# Patient Record
Sex: FEMALE | Race: BLACK OR AFRICAN AMERICAN | NOT HISPANIC OR LATINO | Employment: UNEMPLOYED | URBAN - METROPOLITAN AREA
[De-identification: names, ages, dates, MRNs, and addresses within clinical notes are randomized per-mention and may not be internally consistent; named-entity substitution may affect disease eponyms.]

---

## 2023-05-03 ENCOUNTER — OFFICE VISIT (OUTPATIENT)
Dept: FAMILY MEDICINE CLINIC | Facility: CLINIC | Age: 38
End: 2023-05-03

## 2023-05-03 VITALS
BODY MASS INDEX: 36.16 KG/M2 | HEART RATE: 68 BPM | TEMPERATURE: 97.7 F | SYSTOLIC BLOOD PRESSURE: 124 MMHG | OXYGEN SATURATION: 99 % | HEIGHT: 66 IN | WEIGHT: 225 LBS | DIASTOLIC BLOOD PRESSURE: 82 MMHG

## 2023-05-03 DIAGNOSIS — Z00.00 ANNUAL PHYSICAL EXAM: Primary | ICD-10-CM

## 2023-05-03 DIAGNOSIS — Z86.2 HISTORY OF ANEMIA: ICD-10-CM

## 2023-05-03 DIAGNOSIS — Z13.6 SCREENING FOR CARDIOVASCULAR CONDITION: ICD-10-CM

## 2023-05-03 DIAGNOSIS — Z12.4 CERVICAL CANCER SCREENING: ICD-10-CM

## 2023-05-03 DIAGNOSIS — Z13.1 SCREENING FOR DIABETES MELLITUS: ICD-10-CM

## 2023-05-03 DIAGNOSIS — R06.83 SNORING: ICD-10-CM

## 2023-05-03 DIAGNOSIS — L70.9 ACNE, UNSPECIFIED ACNE TYPE: ICD-10-CM

## 2023-05-03 NOTE — PATIENT INSTRUCTIONS
Wellness Visit for Adults   AMBULATORY CARE:   A wellness visit  is when you see your healthcare provider to get screened for health problems  Your healthcare provider will also give you advice on how to stay healthy  Write down your questions so you remember to ask them  Ask your healthcare provider how often you should have a wellness visit  What happens at a wellness visit:  Your healthcare provider will ask about your health, and your family history of health problems  This includes high blood pressure, heart disease, and cancer  He or she will ask if you have symptoms that concern you, if you smoke, and about your mood  You may also be asked about your intake of medicines, supplements, food, and alcohol  Any of the following may be done: Your weight  will be checked  Your height may also be checked so your body mass index (BMI) can be calculated  Your BMI shows if you are at a healthy weight  Your blood pressure  and heart rate will be checked  Your temperature may also be checked  Blood and urine tests  may be done  Blood tests may be done to check your cholesterol levels  Abnormal cholesterol levels increase your risk for heart disease and stroke  You may also need a blood or urine test to check for diabetes if you are at increased risk  Urine tests may be done to look for signs of an infection or kidney disease  A physical exam  includes checking your heartbeat and lungs with a stethoscope  Your healthcare provider may also check your skin to look for sun damage  Screening tests  may be recommended  A screening test is done to check for diseases that may not cause symptoms  The screening tests you may need depend on your age, gender, family history, and lifestyle habits  For example, colorectal screening may be recommended if you are 48years old or older  Screening tests you need if you are a woman:   A Pap smear  is used to screen for cervical cancer   Pap smears are usually done every 3 to 5 years depending on your age  You may need them more often if you have had abnormal Pap smear test results in the past  Ask your healthcare provider how often you should have a Pap smear  A mammogram  is an x-ray of your breasts to screen for breast cancer  Experts recommend mammograms every 2 years starting at age 48 years  You may need a mammogram at age 52 years or younger if you have an increased risk for breast cancer  Talk to your healthcare provider about when you should start having mammograms and how often you need them  Vaccines you may need:   Get an influenza vaccine  every year  The influenza vaccine protects you from the flu  Several types of viruses cause the flu  The viruses change over time, so new vaccines are made each year  Get a tetanus-diphtheria (Td) booster vaccine  every 10 years  This vaccine protects you against tetanus and diphtheria  Tetanus is a severe infection that may cause painful muscle spasms and lockjaw  Diphtheria is a severe bacterial infection that causes a thick covering in the back of your mouth and throat  Get a human papillomavirus (HPV) vaccine  if you are female and aged 23 to 32 or male 23 to 24 and never received it  This vaccine protects you from HPV infection  HPV is the most common infection spread by sexual contact  HPV may also cause vaginal, penile, and anal cancers  Get a pneumococcal vaccine  if you are aged 72 years or older  The pneumococcal vaccine is an injection given to protect you from pneumococcal disease  Pneumococcal disease is an infection caused by pneumococcal bacteria  The infection may cause pneumonia, meningitis, or an ear infection  Get a shingles vaccine  if you are 60 or older, even if you have had shingles before  The shingles vaccine is an injection to protect you from the varicella-zoster virus  This is the same virus that causes chickenpox   Shingles is a painful rash that develops in people who had chickenpox or have been exposed to the virus  How to eat healthy:  My Plate is a model for planning healthy meals  It shows the types and amounts of foods that should go on your plate  Fruits and vegetables make up about half of your plate, and grains and protein make up the other half  A serving of dairy is included on the side of your plate  The amount of calories and serving sizes you need depends on your age, gender, weight, and height  Examples of healthy foods are listed below:  Eat a variety of vegetables  such as dark green, red, and orange vegetables  You can also include canned vegetables low in sodium (salt) and frozen vegetables without added butter or sauces  Eat a variety of fresh fruits , canned fruit in 100% juice, frozen fruit, and dried fruit  Include whole grains  At least half of the grains you eat should be whole grains  Examples include whole-wheat bread, wheat pasta, brown rice, and whole-grain cereals such as oatmeal     Eat a variety of protein foods such as seafood (fish and shellfish), lean meat, and poultry without skin (turkey and chicken)  Examples of lean meats include pork leg, shoulder, or tenderloin, and beef round, sirloin, tenderloin, and extra lean ground beef  Other protein foods include eggs and egg substitutes, beans, peas, soy products, nuts, and seeds  Choose low-fat dairy products such as skim or 1% milk or low-fat yogurt, cheese, and cottage cheese  Limit unhealthy fats  such as butter, hard margarine, and shortening  Exercise:  Exercise at least 30 minutes per day on most days of the week  Some examples of exercise include walking, biking, dancing, and swimming  You can also fit in more physical activity by taking the stairs instead of the elevator or parking farther away from stores  Include muscle strengthening activities 2 days each week  Regular exercise provides many health benefits   It helps you manage your weight, and decreases your risk for type 2 diabetes, heart disease, stroke, and high blood pressure  Exercise can also help improve your mood  Ask your healthcare provider about the best exercise plan for you  General health and safety guidelines:   Do not smoke  Nicotine and other chemicals in cigarettes and cigars can cause lung damage  Ask your healthcare provider for information if you currently smoke and need help to quit  E-cigarettes or smokeless tobacco still contain nicotine  Talk to your healthcare provider before you use these products  Limit alcohol  A drink of alcohol is 12 ounces of beer, 5 ounces of wine, or 1½ ounces of liquor  Lose weight, if needed  Being overweight increases your risk of certain health conditions  These include heart disease, high blood pressure, type 2 diabetes, and certain types of cancer  Protect your skin  Do not sunbathe or use tanning beds  Use sunscreen with a SPF 15 or higher  Apply sunscreen at least 15 minutes before you go outside  Reapply sunscreen every 2 hours  Wear protective clothing, hats, and sunglasses when you are outside  Drive safely  Always wear your seatbelt  Make sure everyone in your car wears a seatbelt  A seatbelt can save your life if you are in an accident  Do not use your cell phone when you are driving  This could distract you and cause an accident  Pull over if you need to make a call or send a text message  Practice safe sex  Use latex condoms if are sexually active and have more than one partner  Your healthcare provider may recommend screening tests for sexually transmitted infections (STIs)  Wear helmets, lifejackets, and protective gear  Always wear a helmet when you ride a bike or motorcycle, go skiing, or play sports that could cause a head injury  Wear protective equipment when you play sports  Wear a lifejacket when you are on a boat or doing water sports      © Copyright [employer] 2022 Information is for End User's use only and may not be sold, redistributed or otherwise used for commercial purposes  The above information is an  only  It is not intended as medical advice for individual conditions or treatments  Talk to your doctor, nurse or pharmacist before following any medical regimen to see if it is safe and effective for you

## 2023-05-03 NOTE — PROGRESS NOTES
[address]    NAME: Rafael Ferrari  AGE: 40 y o  SEX: female  : 1985     DATE: 5/3/2023     Assessment and Plan:     Problem List Items Addressed This Visit    None  Visit Diagnoses     Annual physical exam    -  Primary    Screening for diabetes mellitus        Relevant Orders    Hemoglobin A1C    Screening for cardiovascular condition        Relevant Orders    Lipid panel    BMI 36 0-36 9,adult        Relevant Orders    CBC and Platelet    Comprehensive metabolic panel    TSH, 3rd generation with Free T4 reflex    History of anemia        Relevant Orders    Iron Panel (Includes Ferritin, Iron Sat%, Iron, and TIBC)    Acne, unspecified acne type        Relevant Orders    Ambulatory Referral to Dermatology    Snoring        Relevant Orders    Ambulatory Referral to Sleep Medicine    Cervical cancer screening        Relevant Orders    Ambulatory Referral to Gynecology          Immunizations and preventive care screenings were discussed with patient today  Appropriate education was printed on patient's after visit summary  Counseling:  Exercise: the importance of regular exercise/physical activity was discussed  Recommend exercise 3-5 times per week for at least 30 minutes  BMI Counseling: Body mass index is 36 32 kg/m²  The BMI is above normal  Nutrition recommendations include decreasing portion sizes, encouraging healthy choices of fruits and vegetables, decreasing fast food intake, consuming healthier snacks, limiting drinks that contain sugar and moderation in carbohydrate intake  Exercise recommendations include moderate physical activity 150 minutes/week, exercising 3-5 times per week and strength training exercises  No pharmacotherapy was ordered  Rationale for BMI follow-up plan is due to patient being overweight or obese       Depression Screening and Follow-up Plan: Patient's depression screening was positive with a PHQ-2 score of 3  Their PHQ-9 score was 13  Return in 1 year (on 5/3/2024)  Chief Complaint:     Chief Complaint   Patient presents with    New Patient Visit    Physical Exam     PE due  History of Present Illness:     Adult Annual Physical   Patient here for a comprehensive physical exam  The patient reports problems - see below  Hx of anemia even before pregnant, unclear etiology (denies sickle cell or other genetic issues) possible heavy periods     Diet and Physical Activity  Diet/Nutrition: well balanced diet  Exercise: walking  Depression Screening  PHQ-2/9 Depression Screening    Little interest or pleasure in doing things: 1 - several days  Feeling down, depressed, or hopeless: 2 - more than half the days  Trouble falling or staying asleep, or sleeping too much: 1 - several days  Feeling tired or having little energy: 3 - nearly every day  Poor appetite or overeatin - several days  Feeling bad about yourself - or that you are a failure or have let yourself or your family down: 3 - nearly every day  Trouble concentrating on things, such as reading the newspaper or watching television: 2 - more than half the days  Moving or speaking so slowly that other people could have noticed  Or the opposite - being so fidgety or restless that you have been moving around a lot more than usual: 0 - not at all  Thoughts that you would be better off dead, or of hurting yourself in some way: 0 - not at all  PHQ-2 Score: 3  PHQ-2 Interpretation: POSITIVE depression screen  PHQ-9 Score: 13   PHQ-9 Interpretation: Moderate depression        General Health  Sleep: sleeps well  Hearing: normal - bilateral   Vision: no vision problems and goes for regular eye exams  Dental: regular dental visits  /GYN Health  Last menstrual period: current  Contraceptive method: barrier methods    History of STDs?: no      Review of Systems:     Review of Systems Constitutional: Negative for chills, fatigue and fever  HENT: Negative for rhinorrhea and sore throat  Eyes: Negative for visual disturbance  Respiratory: Negative for cough and shortness of breath  Cardiovascular: Negative for chest pain and palpitations  Gastrointestinal: Negative for abdominal pain, constipation, diarrhea, nausea and vomiting  Genitourinary: Negative for difficulty urinating, dysuria and frequency  Musculoskeletal: Negative for arthralgias and myalgias  Skin: Negative for color change and rash  Neurological: Negative for weakness and headaches  Past Medical History:     History reviewed  No pertinent past medical history  Past Surgical History:     Past Surgical History:   Procedure Laterality Date     SECTION        Social History:     Social History     Socioeconomic History    Marital status: /Civil Union     Spouse name: None    Number of children: None    Years of education: None    Highest education level: None   Occupational History    None   Tobacco Use    Smoking status: Every Day     Types: Cigarettes     Start date:     Smokeless tobacco: None   Substance and Sexual Activity    Alcohol use: Yes    Drug use: Never    Sexual activity: Yes   Other Topics Concern    None   Social History Narrative    None     Social Determinants of Health     Financial Resource Strain: Not on file   Food Insecurity: Not on file   Transportation Needs: Not on file   Physical Activity: Not on file   Stress: Not on file   Social Connections: Not on file   Intimate Partner Violence: Not on file   Housing Stability: Not on file      Family History:     History reviewed  No pertinent family history  Current Medications:     No current outpatient medications on file  No current facility-administered medications for this visit        Allergies:     No Known Allergies   Physical Exam:     /82 (BP Location: Left arm, Patient Position: Sitting, "Cuff Size: Standard)   Pulse 68   Temp 97 7 °F (36 5 °C)   Ht 5' 6\" (1 676 m)   Wt 102 kg (225 lb)   SpO2 99%   BMI 36 32 kg/m²     Physical Exam  Constitutional:       General: She is not in acute distress  Appearance: Normal appearance  She is obese  She is not ill-appearing  HENT:      Head: Normocephalic and atraumatic  Right Ear: Tympanic membrane, ear canal and external ear normal       Left Ear: Tympanic membrane, ear canal and external ear normal       Nose: Nose normal       Mouth/Throat:      Mouth: Mucous membranes are moist       Pharynx: Oropharynx is clear  No oropharyngeal exudate or posterior oropharyngeal erythema  Eyes:      General: No scleral icterus  Right eye: No discharge  Left eye: No discharge  Extraocular Movements: Extraocular movements intact  Conjunctiva/sclera: Conjunctivae normal       Pupils: Pupils are equal, round, and reactive to light  Cardiovascular:      Rate and Rhythm: Normal rate and regular rhythm  Pulses: Normal pulses  Heart sounds: Normal heart sounds  No murmur heard  Pulmonary:      Effort: Pulmonary effort is normal  No respiratory distress  Breath sounds: Normal breath sounds  Abdominal:      General: Bowel sounds are normal       Palpations: Abdomen is soft  Tenderness: There is no abdominal tenderness  Musculoskeletal:         General: Normal range of motion  Cervical back: Normal range of motion and neck supple  Lymphadenopathy:      Cervical: No cervical adenopathy  Skin:     General: Skin is warm and dry  Capillary Refill: Capillary refill takes less than 2 seconds  Neurological:      General: No focal deficit present  Mental Status: She is alert and oriented to person, place, and time  Mental status is at baseline  Cranial Nerves: No cranial nerve deficit     Psychiatric:         Mood and Affect: Mood normal           Keith Jackson MD   [address] [place] [address]" PRACTICE [id number]  Depression Screening Follow-up Plan: Patient's depression screening was positive with a PHQ-2 score of 3  Their PHQ-9 score was 13  Patient assessed for underlying major depression  They have no active suicidal ideations  Brief counseling provided and recommend additional follow-up/re-evaluation next office visit

## 2023-05-17 ENCOUNTER — OFFICE VISIT (OUTPATIENT)
Age: 38
End: 2023-05-17

## 2023-05-17 VITALS
DIASTOLIC BLOOD PRESSURE: 81 MMHG | OXYGEN SATURATION: 98 % | BODY MASS INDEX: 36.64 KG/M2 | RESPIRATION RATE: 18 BRPM | HEIGHT: 66 IN | SYSTOLIC BLOOD PRESSURE: 122 MMHG | TEMPERATURE: 98.5 F | HEART RATE: 73 BPM | WEIGHT: 228 LBS

## 2023-05-17 DIAGNOSIS — R06.83 SNORING: ICD-10-CM

## 2023-05-17 DIAGNOSIS — G47.33 OSA (OBSTRUCTIVE SLEEP APNEA): Primary | ICD-10-CM

## 2023-05-17 DIAGNOSIS — E66.9 OBESITY (BMI 30-39.9): ICD-10-CM

## 2023-05-17 NOTE — PROGRESS NOTES
"Assessment/Plan:     Diagnoses and all orders for this visit:    EMMANUEL (obstructive sleep apnea)  -     Diagnostic Sleep Study; Future    Snoring  -     Ambulatory Referral to Sleep Medicine    Obesity (BMI 30-39  9)          Plan for follow up:  obstructive sleep apnea Etiology pathogenesis discussed with the patient in detail  Patient has signs and symptoms of obstructive sleep apnea  He will undergo an all night polysomnogram and if there is evidence of abnormal nocturnal breathing he will undergo a CPAP titration study for maximal benefit  Consequences of untreated sleep apnea including excessive daytime sleepiness and increased risk for myocardial infarction stroke atrial fibrillation discussed with the patient  Testing procedure was discussed in detail  Cautioned against driving when sleepy  Recommend weight loss  Follow-up after the above testing  No follow-ups on file  All questions are answered to the patient's satisfaction and understanding  She verbalizes understanding  She is encouraged to call with any further questions or concerns  Portions of the record may have been created with voice recognition software  Occasional wrong word or \"sound a like\" substitutions may have occurred due to the inherent limitations of voice recognition software  Read the chart carefully and recognize, using context, where substitutions have occurred  a    Electronically Signed by Shelley Campos MD    ______________________________________________________________________    Chief Complaint:   Chief Complaint   Patient presents with   • Sleeping Problem        Patient ID: [id number] is a 40 y o  y o  female has no past medical history on file  5/17/2023  Patient presents today for initial visit  Patient is here for evaluation for obstructive sleep apnea    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, from 6 PM to 6 AM and she does have about 15 minutes commute to work, her sleep schedule when she works nights, " she goes to bed at around 7:30 AM falls asleep quickly and is out of bed at 3 PM usually does not have any awakenings she states, very occasionally her 25month-old son wakes her up, the nights that she is not working she states she is still keeps herself up until 4 AM and then falls asleep until 11 AM or noon time, sometimes has to wake up early with the kids she states  No history of any early morning headaches no symptoms related to restless legs  Occupational/Exposure history:[employer] , 6 pm -6 am   Travel history: None  Review of Systems   Constitutional: Positive for unexpected weight change (20-25 lbs in the past year )  HENT: Negative  Eyes: Negative  Respiratory: Negative  Loud snoring, does not know of any witnessed apneic spells, does have choking and gasping for air she states  Cardiovascular: Negative  Gastrointestinal: Negative  Endocrine: Negative  Genitourinary: Negative  Musculoskeletal: Negative  Allergic/Immunologic: Negative  Neurological: Negative  Hematological: Negative  Psychiatric/Behavioral: Negative  Social history: She reports that she has been smoking cigarettes  She started smoking about 23 years ago  She has never used smokeless tobacco  She reports current alcohol use  She reports that she does not use drugs  Past surgical history:   Past Surgical History:   Procedure Laterality Date   •  SECTION       Family history: History reviewed  No pertinent family history  There is no immunization history on file for this patient  No current outpatient medications on file  No current facility-administered medications for this visit  Allergies: Patient has no known allergies      Objective:  Vitals:    23 1412 23 1415   BP: 122/81    BP Location: Left arm    Patient Position: Sitting    Cuff Size: Large    Pulse: 73    Resp: 18    Temp: 98 5 °F (36 9 °C)    SpO2: 98% 98%   Weight: 103 kg (228 "lb)    Height: 5' 6\" (1 676 m)    Oxygen Therapy  SpO2: 98 %  Oxygen Therapy: None (Room air)    Wt Readings from Last 3 Encounters:   05/17/23 103 kg (228 lb)   05/03/23 102 kg (225 lb)     Body mass index is 36 8 kg/m²  Physical Exam  Constitutional:       Appearance: She is well-developed  HENT:      Head: Normocephalic and atraumatic  Mouth/Throat:      Comments: Crowded oropharyngeal airways  Eyes:      Pupils: Pupils are equal, round, and reactive to light  Neck:      Comments: Short and wide neck  Cardiovascular:      Rate and Rhythm: Normal rate and regular rhythm  Heart sounds: Normal heart sounds  Pulmonary:      Effort: Pulmonary effort is normal       Breath sounds: Normal breath sounds  Musculoskeletal:         General: Normal range of motion  Cervical back: Normal range of motion and neck supple  Skin:     General: Skin is warm and dry  Neurological:      Mental Status: She is alert and oriented to person, place, and time  Psychiatric:         Behavior: Behavior normal            Diagnostics:  I have personally reviewed pertinent reports      ESS: Total score: 7  "

## 2024-12-01 ENCOUNTER — HOSPITAL ENCOUNTER (EMERGENCY)
Facility: HOSPITAL | Age: 39
Discharge: HOME | End: 2024-12-01
Attending: STUDENT IN AN ORGANIZED HEALTH CARE EDUCATION/TRAINING PROGRAM

## 2024-12-01 VITALS
OXYGEN SATURATION: 99 % | SYSTOLIC BLOOD PRESSURE: 157 MMHG | BODY MASS INDEX: 40.28 KG/M2 | RESPIRATION RATE: 18 BRPM | DIASTOLIC BLOOD PRESSURE: 95 MMHG | TEMPERATURE: 98 F | HEIGHT: 66 IN | HEART RATE: 81 BPM | WEIGHT: 250.66 LBS

## 2024-12-01 DIAGNOSIS — R30.0 DYSURIA: ICD-10-CM

## 2024-12-01 DIAGNOSIS — R10.2 VAGINAL PAIN: Primary | ICD-10-CM

## 2024-12-01 LAB
BACTERIA UR QL AUTO: ABNORMAL /HPF
BILIRUB UR QL STRIP: NEGATIVE
CLARITY UR: ABNORMAL
COLOR UR: YELLOW
GLUCOSE UR STRIP-MCNC: NEGATIVE MG/DL
HGB UR QL STRIP.AUTO: ABNORMAL
KETONES UR STRIP-MCNC: NEGATIVE MG/DL
LEUKOCYTE ESTERASE UR QL STRIP: ABNORMAL
MUCOUS THREADS UR QL AUTO: ABNORMAL
NITRITE UR QL STRIP: NEGATIVE
NON-SQ EPI CELLS URNS QL MICRO: ABNORMAL /HPF
PH UR STRIP.AUTO: 5.5 [PH]
PROT UR STRIP-MCNC: NEGATIVE MG/DL
RBC #/AREA URNS AUTO: ABNORMAL /HPF
SP GR UR STRIP.AUTO: 1.03 (ref 1–1.03)
UROBILINOGEN UR STRIP-ACNC: <2 MG/DL
WBC #/AREA URNS AUTO: ABNORMAL /HPF

## 2024-12-01 PROCEDURE — 87563 M. GENITALIUM AMP PROBE: CPT | Performed by: STUDENT IN AN ORGANIZED HEALTH CARE EDUCATION/TRAINING PROGRAM

## 2024-12-01 PROCEDURE — 81514 NFCT DS BV&VAGINITIS DNA ALG: CPT | Performed by: PHYSICIAN ASSISTANT

## 2024-12-01 PROCEDURE — 87591 N.GONORRHOEAE DNA AMP PROB: CPT | Performed by: STUDENT IN AN ORGANIZED HEALTH CARE EDUCATION/TRAINING PROGRAM

## 2024-12-01 PROCEDURE — 87661 TRICHOMONAS VAGINALIS AMPLIF: CPT | Performed by: STUDENT IN AN ORGANIZED HEALTH CARE EDUCATION/TRAINING PROGRAM

## 2024-12-01 PROCEDURE — 87491 CHLMYD TRACH DNA AMP PROBE: CPT | Performed by: STUDENT IN AN ORGANIZED HEALTH CARE EDUCATION/TRAINING PROGRAM

## 2024-12-01 PROCEDURE — 81001 URINALYSIS AUTO W/SCOPE: CPT | Performed by: STUDENT IN AN ORGANIZED HEALTH CARE EDUCATION/TRAINING PROGRAM

## 2024-12-01 PROCEDURE — 99284 EMERGENCY DEPT VISIT MOD MDM: CPT | Performed by: STUDENT IN AN ORGANIZED HEALTH CARE EDUCATION/TRAINING PROGRAM

## 2024-12-01 PROCEDURE — 99283 EMERGENCY DEPT VISIT LOW MDM: CPT

## 2024-12-01 RX ORDER — FLUCONAZOLE 100 MG/1
200 TABLET ORAL ONCE
Status: COMPLETED | OUTPATIENT
Start: 2024-12-01 | End: 2024-12-01

## 2024-12-01 RX ADMIN — FLUCONAZOLE 200 MG: 100 TABLET ORAL at 12:48

## 2024-12-01 NOTE — DISCHARGE INSTRUCTIONS
Use over-the-counter medication as needed for discomfort.  Cultures are currently pending.    Follow-up with OB/GYN for reevaluation.    Return for any new or worsening symptoms.

## 2024-12-01 NOTE — ED PROVIDER NOTES
Time reflects when diagnosis was documented in both MDM as applicable and the Disposition within this note       Time User Action Codes Description Comment    12/1/2024 12:51 PM Lashawn Manzanares Add [R10.2] Vaginal pain     12/1/2024 12:51 PM Lashawn Manzanares Add [R30.0] Dysuria           ED Disposition       ED Disposition   Discharge    Condition   Stable    Date/Time   Sun Dec 1, 2024 12:51 PM    Comment   Ju Womackaggart discharge to home/self care.                   Assessment & Plan       Medical Decision Making  Differential UTI, STD, yeast infection.    Patient is a well-appearing 39-year-old female present emerged part no acute respiratory stress and vital signs unremarkable.  A bedside vaginal exam was performed with nurse at bedside to look for foreign body.  Discussed testing with patient at bedside.  Looking for STD.  Patient empirically treated for a yeast infection.    Discussed symptomatic management as well as return follow-up precautions.  Discussed need for follow-up with OB/GYN.  Disposition discharge    Amount and/or Complexity of Data Reviewed  Labs: ordered.    Risk  Prescription drug management.             Medications   fluconazole (DIFLUCAN) tablet 200 mg (200 mg Oral Given 12/1/24 1248)       ED Risk Strat Scores                           SBIRT 22yo+      Flowsheet Row Most Recent Value   Initial Alcohol Screen: US AUDIT-C     1. How often do you have a drink containing alcohol? 1 Filed at: 12/01/2024 1128   2. How many drinks containing alcohol do you have on a typical day you are drinking?  1 Filed at: 12/01/2024 1128   3b. FEMALE Any Age, or MALE 65+: How often do you have 4 or more drinks on one occassion? 0 Filed at: 12/01/2024 1128   Audit-C Score 2 Filed at: 12/01/2024 1128   HERMELINDA: How many times in the past year have you...    Used an illegal drug or used a prescription medication for non-medical reasons? Never Filed at: 12/01/2024 1128                            History of Present  "Illness       Chief Complaint   Patient presents with    Vaginal Pain     Pt states \"when I urinate, I have pain and it feels like something is stuck in my vagina. After I had intercourse with my boyfriend on Monday and after that, there was a lot of blood on the floor. No condoms were used. I finished my period a couple days before this happened, so Im not sure if a tampon is stuck there.\"       History reviewed. No pertinent past medical history.   Past Surgical History:   Procedure Laterality Date     SECTION        History reviewed. No pertinent family history.   Social History     Tobacco Use    Smoking status: Every Day     Types: Cigarettes     Start date:     Smokeless tobacco: Never   Substance Use Topics    Alcohol use: Yes     Comment: socially    Drug use: Never      E-Cigarette/Vaping      E-Cigarette/Vaping Substances      I have reviewed and agree with the history as documented.     HPI    Patient is a 39-year-old female present emerged department for vaginal plain and spotting.  Patient's last menstrual cycle was a couple days ago.  Patient concerned that maybe she had a tampon stuck.  Patient reports that she has dysuria.  No noted fevers chills nausea or vomiting.  No noted abdominal discomfort.  History of .    Review of Systems   Constitutional:  Negative for chills and fever.   HENT:  Negative for ear pain and sore throat.    Eyes:  Negative for pain and visual disturbance.   Respiratory:  Negative for cough and shortness of breath.    Cardiovascular:  Negative for chest pain and palpitations.   Gastrointestinal:  Negative for abdominal pain and vomiting.   Genitourinary:  Positive for dysuria, vaginal bleeding and vaginal discharge. Negative for hematuria.   Musculoskeletal:  Negative for arthralgias and back pain.   Skin:  Negative for color change and rash.   Neurological:  Negative for seizures and syncope.   All other systems reviewed and are " negative.          Objective       ED Triage Vitals [12/01/24 1125]   Temperature Pulse Blood Pressure Respirations SpO2 Patient Position - Orthostatic VS   98 °F (36.7 °C) 81 157/95 18 99 % Sitting      Temp Source Heart Rate Source BP Location FiO2 (%) Pain Score    Temporal Monitor Left arm -- --      Vitals      Date and Time Temp Pulse SpO2 Resp BP Pain Score FACES Pain Rating User   12/01/24 1125 98 °F (36.7 °C) 81 99 % 18 157/95 -- -- RO            Physical Exam  Vitals and nursing note reviewed.   Constitutional:       General: She is not in acute distress.     Appearance: She is well-developed.   HENT:      Head: Normocephalic and atraumatic.   Eyes:      Conjunctiva/sclera: Conjunctivae normal.   Cardiovascular:      Rate and Rhythm: Normal rate and regular rhythm.      Heart sounds: No murmur heard.  Pulmonary:      Effort: Pulmonary effort is normal. No respiratory distress.      Breath sounds: Normal breath sounds.   Abdominal:      Palpations: Abdomen is soft.      Tenderness: There is no abdominal tenderness.   Genitourinary:     Comments: Thick vaginal discharge at cervix mixed with thin watery white discharge.  No noted vaginal abrasions or mucosal skin tears.  Musculoskeletal:         General: No swelling.      Cervical back: Neck supple.   Skin:     General: Skin is warm and dry.      Capillary Refill: Capillary refill takes less than 2 seconds.   Neurological:      General: No focal deficit present.      Mental Status: She is alert and oriented to person, place, and time.   Psychiatric:         Mood and Affect: Mood normal.         Results Reviewed       Procedure Component Value Units Date/Time    Molecular Vaginal Panel [[id number]]  (Normal) Collected: 12/01/24 1157    Lab Status: Final result Specimen: Genital from Vaginal Updated: 12/02/24 1425     Bacterial Vaginosis Negative     Candida species Negative     Candida glabrata Negative     Tali krusei Negative     Trichomonas vaginalis  Negative    Narrative:      This test has been performed using the FDA-approved BD MAX system for the qualitative detection of bacterial vaginosis markers (Lactobacillus spp., Gardnerella vaginalis, Atopobium vaginae, Bacterial Vaginosis Associated Bacteria-2, Megasphaera-1), Candida spp. (C. albicans, C. tropicalis, C. parapsilosis, C. dubliniensis), Candida glabrata, Candida krusei, and Trichomonas vaginalis in vaginal swabs from patients who are symptomatic for vaginitis/vaginosis using polymerase chain reaction (PCR) methodology.    Negative PCR results do not preclude infection and should not be used as the sole basis for treatment or other patient management decisions.    Positive PCR results are indicative of infection, but do not necessarily indicate the presence of viable organisms and do not rule out co-infection with other bacteria or viruses.    As with all polymerase-chain reaction methodology, extremely low levels of target below the limit of detection may be detected, but results may not be reproducible.    All test results should be used as an adjunct to clinical observations and other information available to the provider.    Trichomonas vaginalis/Mycoplasma genitalium PCR [[id number]]  (Normal) Collected: 12/01/24 1203    Lab Status: Final result Specimen: Urine, Clean Catch Updated: 12/02/24 1423     Trichomonas vaginalis Negative     Mycoplasma genitalium Negative    Narrative:      This test was performed using the FDA-approved Garrick 6800 TV/MG assay ([employer]). This test uses real-time PCR to detect Trichomonas vaginalis (TV) and Mycoplasma genitalium (MG) DNA, to assist in identification of suspected infections. This instrument and assay have been validated by the  and performing laboratory and verified by the performing laboratory. Clinically validated specimen sources include urine and swabs (endocervical/vaginal). This test has not been evaluated in patients younger than  18 years of age.   Cervical specimens collected in PreservCyt® Solution are validated for the detection of Trichomonas vaginalis only.  Note that vaginal swabs are considered to be the most sensitive specimen type for MG testing.  Trichomonas vaginalis is a protozoan/amoebic infection that can be transmitted with sexual contact. Appropriate treatment of oneself and of sexual partners should be sought to avoid re-infection.  Mycoplasma genitalium is an increasingly identified and treatable bacterial infection and may be asymptomatic. Long term complications may result from untreated infections. Sexual transmission is possible.    Procedural Limitations  This assay has only been validated for use with male and female urine, clinician-instructed self-collected vaginal swab specimens, clinician-collected vaginal swab specimens, endocervical swab specimens collected in dian® PCR Media. This assay also detects TV DNA in cervical specimens collected in PreservCyt® Solution. Assay performance has not been validated for use with other collection media and/or specimen types.   Detection of Trichomonas vaginalis and/or Mycoplasma genitalium is dependent on the number of organisms present in the specimen. Detection may be affected by specimen collection methods, patient factors, stage of infection, infecting strains, and presence of PCR inhibitors.   A negative result does not preclude the presence of infection as results depend on adequate specimen collection, absence of inhibitors, and sufficient DNA to be detected.   All results should be interpreted in conjunction with other clinical and laboratory data.  The presence of mucus in endocervical specimens may lead to false or invalid results.   Urine testing is recommended to be performed on first catch urine samples. The effects of other collection variables have not been evaluated at this time. The effects of vaginal discharge, tampon use, douching, and other collection  variables have not been evaluated at this time.   This assay has not been evaluated with patients currently being treated with antimicrobial agents active against TV or MG, or patients with a history of hysterectomy.       Chlamydia/GC amplified DNA by PCR [[id number]]  (Normal) Collected: 12/01/24 1159    Lab Status: Final result Specimen: Urine, Other Updated: 12/02/24 1225     N gonorrhoeae, DNA Probe Negative     Chlamydia trachomatis, DNA Probe Negative    Narrative:      This test was performed using the FDA-approved Garrick MooBella0 CT/NG assay ([employer]). This test uses real-time PCR to detect Chlamydia trachomatis (CT) and Neisseria gonorrhoeae (NG). This instrument and assay have been validated by the  and performing laboratory and verified by the performing laboratory.  This test is intended as an aid in the diagnosis of chlamydial and gonococcal disease. This test has not been evaluated in patients younger than 14 years of age and is not recommended for evaluation of suspected sexual abuse. This assay is not intended to replace other exams or tests for diagnosis of urogenital infection by causative factors other than Chalmydia trachomatis (CT) and Neisseria gonorrhoeae (NG). Additional testing is recommended when the results do not correlate with clinical signs and symptoms.   Procedural Limitations  This assay has only been validated for use with male and female urine, clinician-instructed self-collected vaginal swab specimens, clinician-collected vaginal swab specimens, endocervical swab specimens collected in garrick® PCR Media and cervical specimens collected in PreservCyt® Solution. Assay performance has not been validated for use with other collection media and/or specimen types.   Detection of C. trachomatis and N. gonorrhoeaea is dependent on the number of organisms present in the specimen. Detection may be affected by specimen collection methods, patient factors, stage of infection,  infecting strains, and presence of polymerase/PCR inhibitors.   When CT is present at very high concentrations, the detection of NG present at concentrations near the limit of detection may be impacted.  The presence of mucus in endocervical specimens may lead to false negative results.  The presence of whole blood in urine and cervical specimens collected in PreservCyt Solution may lead to false negative and/or invalid test results.   Urine testing is recommended to be performed on first catch urine samples. The effects of other collection variables have not been evaluated at this time. The effects of vaginal discharge, tampon use, douching, and other collection variables have not been evaluated at this time.   This assay has not been evaluated with patients currently being treated with antimicrobial agents active against CT or NG, or patients with a history of hysterectomy.     Urine Microscopic [[id number]]  (Abnormal) Collected: 12/01/24 1159    Lab Status: Final result Specimen: Urine, Clean Catch Updated: 12/01/24 1236     RBC, UA Innumerable /hpf      WBC, UA 4-10 /hpf      Epithelial Cells Occasional /hpf      Bacteria, UA Occasional /hpf      MUCUS THREADS Occasional    UA w Reflex to Microscopic w Reflex to Culture [[id number]]  (Abnormal) Collected: 12/01/24 1159    Lab Status: Final result Specimen: Urine, Clean Catch Updated: 12/01/24 1216     Color, UA Yellow     Clarity, UA Turbid     Specific Gravity, UA 1.029     pH, UA 5.5     Leukocytes, UA Small     Nitrite, UA Negative     Protein, UA Negative mg/dl      Glucose, UA Negative mg/dl      Ketones, UA Negative mg/dl      Urobilinogen, UA <2.0 mg/dl      Bilirubin, UA Negative     Occult Blood, UA Large            No orders to display       Procedures    ED Medication and Procedure Management   None     There are no discharge medications for this patient.    No discharge procedures on file.  ED SEPSIS DOCUMENTATION   Time reflects when diagnosis was  documented in both MDM as applicable and the Disposition within this note       Time User Action Codes Description Comment    12/1/2024 12:51 PM Lashawn Manzanares Add [R10.2] Vaginal pain     12/1/2024 12:51 PM Lashawn Manzanares [R30.0] Dysuria                  Lashawn Manzanares DO  12/03/24 4676

## 2024-12-02 LAB
C GLABRATA DNA VAG QL NAA+PROBE: NEGATIVE
C KRUSEI DNA VAG QL NAA+PROBE: NEGATIVE
C TRACH DNA SPEC QL NAA+PROBE: NEGATIVE
CANDIDA SP 6 PNL VAG NAA+PROBE: NEGATIVE
M GENITALIUM DNA SPEC QL NAA+PROBE: NEGATIVE
N GONORRHOEA DNA SPEC QL NAA+PROBE: NEGATIVE
T VAGINALIS DNA SPEC QL NAA+PROBE: NEGATIVE
T VAGINALIS DNA VAG QL NAA+PROBE: NEGATIVE
VAGINOSIS/ITIS DNA PNL VAG PROBE+SIG AMP: NEGATIVE

## 2025-02-09 ENCOUNTER — APPOINTMENT (EMERGENCY)
Dept: CT IMAGING | Facility: HOSPITAL | Age: 40
End: 2025-02-09

## 2025-02-09 ENCOUNTER — HOSPITAL ENCOUNTER (EMERGENCY)
Facility: HOSPITAL | Age: 40
Discharge: HOME | End: 2025-02-09

## 2025-02-09 ENCOUNTER — APPOINTMENT (EMERGENCY)
Dept: RADIOLOGY | Facility: HOSPITAL | Age: 40
End: 2025-02-09

## 2025-02-09 VITALS
HEIGHT: 66 IN | DIASTOLIC BLOOD PRESSURE: 86 MMHG | WEIGHT: 250.22 LBS | SYSTOLIC BLOOD PRESSURE: 142 MMHG | RESPIRATION RATE: 18 BRPM | HEART RATE: 77 BPM | TEMPERATURE: 97.9 F | OXYGEN SATURATION: 100 % | BODY MASS INDEX: 40.21 KG/M2

## 2025-02-09 DIAGNOSIS — R51.9 HEADACHE: Primary | ICD-10-CM

## 2025-02-09 DIAGNOSIS — M25.561 RIGHT KNEE PAIN: ICD-10-CM

## 2025-02-09 DIAGNOSIS — G93.9 BRAIN LESION: ICD-10-CM

## 2025-02-09 LAB
ALBUMIN SERPL BCG-MCNC: 4.3 G/DL (ref 3.5–5)
ALP SERPL-CCNC: 53 U/L (ref 34–104)
ALT SERPL W P-5'-P-CCNC: 18 U/L (ref 7–52)
ANION GAP SERPL CALCULATED.3IONS-SCNC: 7 MMOL/L (ref 4–13)
AST SERPL W P-5'-P-CCNC: 18 U/L (ref 13–39)
BASOPHILS # BLD AUTO: 0.04 THOUSANDS/ÂΜL (ref 0–0.1)
BASOPHILS NFR BLD AUTO: 0 % (ref 0–1)
BILIRUB SERPL-MCNC: 0.37 MG/DL (ref 0.2–1)
BUN SERPL-MCNC: 11 MG/DL (ref 5–25)
CALCIUM SERPL-MCNC: 8.9 MG/DL (ref 8.4–10.2)
CHLORIDE SERPL-SCNC: 108 MMOL/L (ref 96–108)
CO2 SERPL-SCNC: 25 MMOL/L (ref 21–32)
CREAT SERPL-MCNC: 0.7 MG/DL (ref 0.6–1.3)
EOSINOPHIL # BLD AUTO: 0.18 THOUSAND/ÂΜL (ref 0–0.61)
EOSINOPHIL NFR BLD AUTO: 2 % (ref 0–6)
ERYTHROCYTE [DISTWIDTH] IN BLOOD BY AUTOMATED COUNT: 16.2 % (ref 11.6–15.1)
EXT PREGNANCY TEST URINE: NEGATIVE
EXT. CONTROL: NORMAL
GAS + CO PNL BLDA: 4.7 % (ref 0–1.5)
GFR SERPL CREATININE-BSD FRML MDRD: 109 ML/MIN/1.73SQ M
GLUCOSE SERPL-MCNC: 89 MG/DL (ref 65–140)
HCT VFR BLD AUTO: 36.2 % (ref 34.8–46.1)
HGB BLD-MCNC: 11.4 G/DL (ref 11.5–15.4)
IMM GRANULOCYTES # BLD AUTO: 0.04 THOUSAND/UL (ref 0–0.2)
IMM GRANULOCYTES NFR BLD AUTO: 0 % (ref 0–2)
LYMPHOCYTES # BLD AUTO: 2.2 THOUSANDS/ÂΜL (ref 0.6–4.47)
LYMPHOCYTES NFR BLD AUTO: 23 % (ref 14–44)
MCH RBC QN AUTO: 27 PG (ref 26.8–34.3)
MCHC RBC AUTO-ENTMCNC: 31.5 G/DL (ref 31.4–37.4)
MCV RBC AUTO: 86 FL (ref 82–98)
MONOCYTES # BLD AUTO: 0.36 THOUSAND/ÂΜL (ref 0.17–1.22)
MONOCYTES NFR BLD AUTO: 4 % (ref 4–12)
NEUTROPHILS # BLD AUTO: 6.83 THOUSANDS/ÂΜL (ref 1.85–7.62)
NEUTS SEG NFR BLD AUTO: 71 % (ref 43–75)
NRBC BLD AUTO-RTO: 0 /100 WBCS
PLATELET # BLD AUTO: 303 THOUSANDS/UL (ref 149–390)
PMV BLD AUTO: 12.2 FL (ref 8.9–12.7)
POTASSIUM SERPL-SCNC: 3.8 MMOL/L (ref 3.5–5.3)
PROT SERPL-MCNC: 7.5 G/DL (ref 6.4–8.4)
RBC # BLD AUTO: 4.23 MILLION/UL (ref 3.81–5.12)
SODIUM SERPL-SCNC: 140 MMOL/L (ref 135–147)
WBC # BLD AUTO: 9.65 THOUSAND/UL (ref 4.31–10.16)

## 2025-02-09 PROCEDURE — 80053 COMPREHEN METABOLIC PANEL: CPT

## 2025-02-09 PROCEDURE — 70498 CT ANGIOGRAPHY NECK: CPT

## 2025-02-09 PROCEDURE — 85025 COMPLETE CBC W/AUTO DIFF WBC: CPT

## 2025-02-09 PROCEDURE — 96365 THER/PROPH/DIAG IV INF INIT: CPT

## 2025-02-09 PROCEDURE — 96375 TX/PRO/DX INJ NEW DRUG ADDON: CPT

## 2025-02-09 PROCEDURE — 82375 ASSAY CARBOXYHB QUANT: CPT

## 2025-02-09 PROCEDURE — 81025 URINE PREGNANCY TEST: CPT

## 2025-02-09 PROCEDURE — 99285 EMERGENCY DEPT VISIT HI MDM: CPT

## 2025-02-09 PROCEDURE — 70496 CT ANGIOGRAPHY HEAD: CPT

## 2025-02-09 PROCEDURE — 36415 COLL VENOUS BLD VENIPUNCTURE: CPT

## 2025-02-09 PROCEDURE — 70450 CT HEAD/BRAIN W/O DYE: CPT

## 2025-02-09 PROCEDURE — 73564 X-RAY EXAM KNEE 4 OR MORE: CPT

## 2025-02-09 PROCEDURE — 93005 ELECTROCARDIOGRAM TRACING: CPT

## 2025-02-09 PROCEDURE — 99284 EMERGENCY DEPT VISIT MOD MDM: CPT

## 2025-02-09 RX ORDER — DIPHENHYDRAMINE HYDROCHLORIDE 50 MG/ML
25 INJECTION INTRAMUSCULAR; INTRAVENOUS ONCE
Status: COMPLETED | OUTPATIENT
Start: 2025-02-09 | End: 2025-02-09

## 2025-02-09 RX ORDER — DEXAMETHASONE SODIUM PHOSPHATE 10 MG/ML
10 INJECTION, SOLUTION INTRAMUSCULAR; INTRAVENOUS ONCE
Status: COMPLETED | OUTPATIENT
Start: 2025-02-09 | End: 2025-02-09

## 2025-02-09 RX ORDER — METOCLOPRAMIDE HYDROCHLORIDE 5 MG/ML
10 INJECTION INTRAMUSCULAR; INTRAVENOUS ONCE
Status: COMPLETED | OUTPATIENT
Start: 2025-02-09 | End: 2025-02-09

## 2025-02-09 RX ORDER — MAGNESIUM SULFATE HEPTAHYDRATE 40 MG/ML
2 INJECTION, SOLUTION INTRAVENOUS ONCE
Status: COMPLETED | OUTPATIENT
Start: 2025-02-09 | End: 2025-02-09

## 2025-02-09 RX ADMIN — METOCLOPRAMIDE 10 MG: 5 INJECTION, SOLUTION INTRAMUSCULAR; INTRAVENOUS at 15:06

## 2025-02-09 RX ADMIN — IOHEXOL 85 ML: 350 INJECTION, SOLUTION INTRAVENOUS at 17:34

## 2025-02-09 RX ADMIN — DIPHENHYDRAMINE HYDROCHLORIDE 25 MG: 50 INJECTION, SOLUTION INTRAMUSCULAR; INTRAVENOUS at 15:06

## 2025-02-09 RX ADMIN — MAGNESIUM SULFATE HEPTAHYDRATE 2 G: 40 INJECTION, SOLUTION INTRAVENOUS at 15:28

## 2025-02-09 RX ADMIN — DEXAMETHASONE SODIUM PHOSPHATE 10 MG: 10 INJECTION, SOLUTION INTRAMUSCULAR; INTRAVENOUS at 15:06

## 2025-02-09 RX ADMIN — SODIUM CHLORIDE 1000 ML: 0.9 INJECTION, SOLUTION INTRAVENOUS at 15:07

## 2025-02-09 NOTE — ED PROVIDER NOTES
Time reflects when diagnosis was documented in both MDM as applicable and the Disposition within this note       Time User Action Codes Description Comment    2/9/2025  3:08 PM Elrawashdy, Heba Add [M25.561] Right knee pain     2/9/2025  3:08 PM Elrawashdy, Heba Add [R51.9] Headache     2/9/2025  3:08 PM Elrawashdy, Heba Modify [M25.561] Right knee pain     2/9/2025  3:08 PM Elrawashdy, Heba Modify [R51.9] Headache     2/9/2025  6:23 PM Elrawashdy, Heba Add [G93.9] Brain lesion           ED Disposition       ED Disposition   Discharge    Condition   Stable    Date/Time   Sun Feb 9, 2025  6:22 PM    Comment   Ju Gladis discharge to home/self care.                   Assessment & Plan       Medical Decision Making  Vital signs stable.  Afebrile.  Head is normocephalic and atraumatic.  No cervical spine tenderness.  Normal neurologic exam.  Patient has no tenderness or laxity to right knee.  Normal range of motion.  Pain is elicited with range of motion but patient is observed to be able to walk.    Ordered CT head without contrast, carboxyhemoglobin, urine pregnancy, right knee x-ray, IV fluids, IV magnesium, IV Decadron, IV Reglan, and IV Benadryl.  Urine pregnancy was negative.  No acute osseous abnormality on the right knee x-ray but does show medial joint line narrowing and small effusion.  On reevaluation patient feeling much better and has no headache.  Carboxyhemoglobin was slightly elevated at 4.7.    Patient reports that she smokes. Advised her to check her carbon monoxide detectors at home to ensure that they are working and have new batteries. Advised patient that if other family members are having headache they may be slowly getting exposed to carbon monoxide and that they must ensure that they have working CO detectors and that they have adequate ventilation for oil heating system.     CT head without contrast that showed a 6 mm x 5 mm hyperdensity and it is recommended that we order a CTA and MRI  "at a later date to further evaluate.  CTA was ordered and suggested that the area of hypodensity is either  \"a proteinaceous Rathke's cleft cyst or hyperdense   small pituitary adenoma. Recommend further evaluation with pituitary protocol MRI without and with contrast. \"    Recommended rest, fluids, 650 mg Tylenol every 6 hours as needed, 600 mg of ibuprofen every 6 hours as needed, magnesium, keeping a headache diary, and ensuring that carbon monoxide detectors are functioning.  Patient provided with a hinged knee splint and advised to rest, elevate, and ice the knee.  Advised to f/u with PCP, neurology, and Ortho. Case discussed with Dr. Hernandez. Advised patient to return with any new or worsening symptoms including but not limited to worsening or uncontrolled pain, vision changes, recurrent vomiting, difficulty with normal activities, abnormal behavior, difficulty walking, numbness, weakness, worsening or uncontrolled pain, numbness, tingling, or weakness to your legs, difficulty walking, worsening knee swelling or redness, or any other concerning symptoms. Discussed assessment and plan with patient who verbalizes understanding and agrees with plan.    Amount and/or Complexity of Data Reviewed  Labs: ordered. Decision-making details documented in ED Course.  Radiology: ordered. Decision-making details documented in ED Course.    Risk  Prescription drug management.        ED Course as of 02/09/25 2015   Sharon Feb 09, 2025   1354 Blood Pressure(!): 177/94   1354 Temperature: 97.9 °F (36.6 °C)   1354 Pulse: 89   1354 Respirations: 18   1354 SpO2: 100 %   1509 PREGNANCY TEST URINE: Negative   1523 XR knee 4+ vw right injury  No acute osseous abnormality.   1620 Carbon Monoxide, Blood(!): 4.7   1620 Carbon Monoxide, Blood(!): 4.7  Patient reports that she smokes. Advised her to check her carbon monoxide detectors at home to ensure that they are working and have new batteries. Advised patient that if other family members " are having headache they may be slowly getting exposed to carbon monoxide and that they must ensure that they have working CO detectors and that they have adequate ventilation for oil heating system.    1627 On reeval, patient's headache has completely resolved.   1636 CT head without contrast  IMPRESSION:  6 mm x 5 mm hyperdense lesion involving the sella, at midline. Leading diagnostic considerations includes a proteinaceous Rathke's cleft cyst, or a small hemorrhagic/hyperdense microadenoma, with an aneurysm felt to be much less likely. Recommend further evaluation with CT angiogram of the head with contrast, as well as pituitary protocol MRI without and with contrast.   1708 CBC and differential(!)  No leukocytosis. No significant anemia.    1756 XR knee 4+ vw right injury  IMPRESSION:  Mild medial joint space narrowing with small joint effusion.   1757 Comprehensive metabolic panel  Normal.  No electrolyte abnormalities.  Normal kidney function and liver enzymes.   1820 IMPRESSION:  No large vessel occlusion, high-grade stenosis, or intracranial aneurysm identified on CT angiogram of the head. 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 Recommend further evaluation with pituitary protocol MRI without and with contrast.     No hemodynamically significant stenosis or dissection identified on CT angiogram of the neck.       Medications   dexamethasone (PF) (DECADRON) injection 10 mg (10 mg Intravenous Given 2/9/25 1506)   metoclopramide (REGLAN) injection 10 mg (10 mg Intravenous Given 2/9/25 1506)   diphenhydrAMINE (BENADRYL) injection 25 mg (25 mg Intravenous Given 2/9/25 1506)   magnesium sulfate 2 g/50 mL IVPB (premix) 2 g (0 g Intravenous Stopped 2/9/25 1623)   sodium chloride 0.9 % bolus 1,000 mL (0 mL Intravenous Stopped 2/9/25 1622)   iohexol (OMNIPAQUE) 350 MG/ML injection  (MULTI-DOSE) 85 mL (85 mL Intravenous Given 25 2824)       ED Risk Strat Scores                          SBIRT 22yo+      Flowsheet Row Most Recent Value   Initial Alcohol Screen: US AUDIT-C     1. How often do you have a drink containing alcohol? 0 Filed at: 2025 1324   2. How many drinks containing alcohol do you have on a typical day you are drinking?  0 Filed at: 2025 1324   3a. Male UNDER 65: How often do you have five or more drinks on one occasion? 0 Filed at: 2025 1324   Audit-C Score 0 Filed at: 2025 1324   HERMELINDA: How many times in the past year have you...    Used an illegal drug or used a prescription medication for non-medical reasons? Never Filed at: 2025 132                            History of Present Illness       Chief Complaint   Patient presents with    Headache     Pt c/o ongoing headache x couple weeks. +intermittent nausea and blurred vision. No current blurred vision. Denies head injuries. Has not tried OTC medication.    Knee Pain     Pt c/o R knee pain x couple weeks. Swelling started 2 days ago. Denies injuries.       History reviewed. No pertinent past medical history.   Past Surgical History:   Procedure Laterality Date     SECTION        History reviewed. No pertinent family history.   Social History     Tobacco Use    Smoking status: Every Day     Types: Cigarettes     Start date:     Smokeless tobacco: Never   Substance Use Topics    Alcohol use: Yes     Comment: socially    Drug use: Never      E-Cigarette/Vaping      E-Cigarette/Vaping Substances      I have reviewed and agree with the history as documented.     Patient is a 39-year-old female who presents complaining of headache onset 1 month ago.  Patient states she has had the headache every day and it does not go away.  Patient has had headaches in the past but this headache will not go away.  Patient has not taken any medications at home since the headache started to alleviate the  pain.  Patient denies any alleviating or aggravating factors.  Patient does report intermittent blurry vision with a headache but no double vision.  Patient states that the headache is frontal and feels more like pressure.  Patient also is complaining of right knee pain that started 1 month ago but worsened over the last 2 days.  Patient states that the knee pain is worse with bending the knee and walking.  Patient reports swelling started 2 days ago.  Patient denies any fever, chills, recent illnesses, injury to the head or knee, prior head trauma, history of concussions, nausea, vomiting, chest pain, shortness of breath, neck pain, rashes, numbness, weakness, speech difficulty, or any other symptoms at this time.      History provided by:  Patient   used: No        Review of Systems   Constitutional: Negative.  Negative for chills and fever.   HENT: Negative.  Negative for congestion, ear pain, rhinorrhea, sinus pressure, sinus pain and sore throat.    Eyes:  Positive for visual disturbance (Blurry vision.  No double vision.). Negative for pain.   Respiratory: Negative.  Negative for cough and shortness of breath.    Cardiovascular: Negative.  Negative for chest pain and leg swelling.   Gastrointestinal: Negative.  Negative for diarrhea, nausea and vomiting.   Genitourinary: Negative.    Musculoskeletal:  Negative for back pain and neck pain.        Reports right knee pain and swelling.   Skin: Negative.  Negative for color change and rash.   Neurological:  Positive for light-headedness and headaches. Negative for dizziness, seizures, syncope, speech difficulty, weakness and numbness.   All other systems reviewed and are negative.          Objective       ED Triage Vitals   Temperature Pulse Blood Pressure Respirations SpO2 Patient Position - Orthostatic VS   02/09/25 1321 02/09/25 1321 02/09/25 1321 02/09/25 1321 02/09/25 1321 02/09/25 1321   97.9 °F (36.6 °C) 89 (!) 177/94 18 100 % Sitting       Temp Source Heart Rate Source BP Location FiO2 (%) Pain Score    02/09/25 1321 02/09/25 1321 02/09/25 1321 -- 02/09/25 1427    Temporal Monitor Left arm  4      Vitals      Date and Time Temp Pulse SpO2 Resp BP Pain Score FACES Pain Rating User   02/09/25 1815 -- 77 100 % 18 142/86 -- -- RJ   02/09/25 1427 -- -- -- -- -- 4 -- AC   02/09/25 1321 97.9 °F (36.6 °C) 89 100 % 18 177/94 -- -- MS            Physical Exam  Vitals and nursing note reviewed.   Constitutional:       General: She is awake. She is not in acute distress.     Appearance: Normal appearance. She is well-developed and well-groomed. She is not ill-appearing, toxic-appearing or diaphoretic.      Comments: Patient is resting comfortably on bed, in no acute distress.  Pleasant and cooperative.   HENT:      Head: Normocephalic and atraumatic.      Right Ear: Tympanic membrane, ear canal and external ear normal.      Left Ear: Tympanic membrane, ear canal and external ear normal.      Nose: Nose normal.      Mouth/Throat:      Lips: Pink.   Eyes:      General: Lids are normal.      Extraocular Movements: Extraocular movements intact.      Conjunctiva/sclera: Conjunctivae normal.      Pupils: Pupils are equal, round, and reactive to light.   Cardiovascular:      Rate and Rhythm: Normal rate.      Pulses:           Posterior tibial pulses are 2+ on the right side and 2+ on the left side.      Heart sounds: Normal heart sounds. No murmur heard.  Pulmonary:      Effort: Pulmonary effort is normal. No tachypnea or respiratory distress.      Breath sounds: Normal breath sounds and air entry. No stridor. No decreased breath sounds, wheezing, rhonchi or rales.   Musculoskeletal:         General: No swelling. Normal range of motion.      Cervical back: Normal range of motion and neck supple. No spinous process tenderness or muscular tenderness.      Right knee: Swelling present. No deformity, erythema, ecchymosis, lacerations or bony tenderness. Normal range of  motion. No tenderness. No LCL laxity, MCL laxity, ACL laxity or PCL laxity.      Instability Tests: Anterior drawer test negative. Posterior drawer test negative. Anterior Lachman test negative.      Left knee: Normal.      Right lower leg: Normal. No edema.      Left lower leg: Normal. No edema.      Right ankle: Normal.      Left ankle: Normal.      Comments: Pain with range of motion of right knee but patient has complete range of motion.  Patient is able to walk on the knee.   Skin:     General: Skin is warm and dry.   Neurological:      General: No focal deficit present.      Mental Status: She is alert and oriented to person, place, and time.      GCS: GCS eye subscore is 4. GCS verbal subscore is 5. GCS motor subscore is 6.      Cranial Nerves: Cranial nerves 2-12 are intact. No cranial nerve deficit, dysarthria or facial asymmetry.      Sensory: Sensation is intact. No sensory deficit.      Motor: Motor function is intact. No weakness or pronator drift.      Coordination: Coordination is intact. Finger-Nose-Finger Test normal.      Gait: Gait is intact.   Psychiatric:         Attention and Perception: Attention normal.         Mood and Affect: Mood normal.         Speech: Speech normal.         Behavior: Behavior normal. Behavior is cooperative.         Results Reviewed       Procedure Component Value Units Date/Time    Comprehensive metabolic panel [[id number]] Collected: 02/09/25 1646    Lab Status: Final result Specimen: Blood from Arm, Left Updated: 02/09/25 1721     Sodium 140 mmol/L      Potassium 3.8 mmol/L      Chloride 108 mmol/L      CO2 25 mmol/L      ANION GAP 7 mmol/L      BUN 11 mg/dL      Creatinine 0.70 mg/dL      Glucose 89 mg/dL      Calcium 8.9 mg/dL      AST 18 U/L      ALT 18 U/L      Alkaline Phosphatase 53 U/L      Total Protein 7.5 g/dL      Albumin 4.3 g/dL      Total Bilirubin 0.37 mg/dL      eGFR 109 ml/min/1.73sq m     Narrative:      National Kid[employer] Foundation guidelines  for Chronic Kidney Disease (CKD):     Stage 1 with normal or high GFR (GFR > 90 mL/min/1.73 square meters)    Stage 2 Mild CKD (GFR = 60-89 mL/min/1.73 square meters)    Stage 3A Moderate CKD (GFR = 45-59 mL/min/1.73 square meters)    Stage 3B Moderate CKD (GFR = 30-44 mL/min/1.73 square meters)    Stage 4 Severe CKD (GFR = 15-29 mL/min/1.73 square meters)    Stage 5 End Stage CKD (GFR <15 mL/min/1.73 square meters)  Note: GFR calculation is accurate only with a steady state creatinine    CBC and differential [[id number]]  (Abnormal) Collected: 02/09/25 1646    Lab Status: Final result Specimen: Blood from Arm, Left Updated: 02/09/25 1656     WBC 9.65 Thousand/uL      RBC 4.23 Million/uL      Hemoglobin 11.4 g/dL      Hematocrit 36.2 %      MCV 86 fL      MCH 27.0 pg      MCHC 31.5 g/dL      RDW 16.2 %      MPV 12.2 fL      Platelets 303 Thousands/uL      nRBC 0 /100 WBCs      Segmented % 71 %      Immature Grans % 0 %      Lymphocytes % 23 %      Monocytes % 4 %      Eosinophils Relative 2 %      Basophils Relative 0 %      Absolute Neutrophils 6.83 Thousands/µL      Absolute Immature Grans 0.04 Thousand/uL      Absolute Lymphocytes 2.20 Thousands/µL      Absolute Monocytes 0.36 Thousand/µL      Eosinophils Absolute 0.18 Thousand/µL      Basophils Absolute 0.04 Thousands/µL     Carboxyhemoglobin [[id number]]  (Abnormal) Collected: 02/09/25 1538    Lab Status: Final result Specimen: Blood from Arm, Left Updated: 02/09/25 1601     Carbon Monoxide, Blood 4.7 %     Narrative:      Therapeutic levels (1 mg/mL and 2 mg/mL) of hydroxocobalamin may interfere with the fCOHb and fMetHb where it may cause lower than expected values  Normal Carboxyhemoglobin range for nonsmokers is <1.5%   Normal Carboxyhemoglobin range for smokers is 1.5% to 5.1%     POCT pregnancy, urine [[id number]]  (Normal) Collected: 02/09/25 1500    Lab Status: Final result Updated: 02/09/25 1507     EXT Preg Test, Ur Negative     Control Valid             CTA head and neck with and without contrast   Final Interpretation by Yosvany Tidwell MD (02/09 1800)         No large vessel occlusion, high-grade stenosis, or intracranial aneurysm identified on CT angiogram of the head. 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 Recommend further evaluation with pituitary protocol MRI without and with contrast.      No hemodynamically significant stenosis or dissection identified on CT angiogram of the neck.      The study was marked in EPIC for immediate notification.                     Workstation performed: [id number]         CT head without contrast   Final Interpretation by Yosvany Tidwell MD (02/09 1626)      6 mm x 5 mm hyperdense lesion involving the sella, at midline. Leading diagnostic considerations includes a proteinaceous Rathke's cleft cyst, or a small hemorrhagic/hyperdense microadenoma, with an aneurysm felt to be much less likely. Recommend further    evaluation with CT angiogram of the head with contrast, as well as pituitary protocol MRI without and with contrast.      The study was marked in EPIC for immediate notification.                  Workstation performed: [id number]         XR knee 4+ vw right injury   Final Interpretation by Christiano Hernandez MD (02/09 1718)      Mild medial joint space narrowing with small joint effusion.         Computerized Assisted Algorithm (CAA) may have been used to analyze all applicable images.         Workstation performed: [id number]             Procedures    ED Medication and Procedure Management   None     There are no discharge medications for this patient.      ED SEPSIS DOCUMENTATION   Time reflects when diagnosis was documented in both MDM as applicable and the Disposition within this note       Time User Action Codes Description Comment    2/9/2025  3:08 PM Elham Morrison Add [M25.561]  Right knee pain     2/9/2025  3:08 PM Elham Morrison Add [R51.9] Headache     2/9/2025  3:08 PM Ehlam Morrison Modify [M25.561] Right knee pain     2/9/2025  3:08 PM Elham Morrison Modify [R51.9] Headache     2/9/2025  6:23 PM Elham Morrison Add [G93.9] Brain lesion                  Elham Morrison PA-C  02/09/25 2015

## 2025-02-09 NOTE — Clinical Note
Ju Griffith was seen and treated in our emergency department on 2/9/2025.                Diagnosis:     Ju  may return to work on return date.    She may return on this date: 02/11/2025         If you have any questions or concerns, please don't hesitate to call.      Elham Morrison PA-C    ______________________________           _______________          _______________  Hospital Representative                              Date                                Time

## 2025-02-09 NOTE — DISCHARGE INSTRUCTIONS
You have been evaluated in the Emergency Department today for headache and knee pain. Your evaluation did not show evidence of medical conditions requiring emergent intervention at this time, and your pain improved with medication in the ED.  We recommend you take 600mg ibuprofen every 6 hours or tylenol 650mg every 6 hours as needed for pain. If needed, you can alternate these medications so that you take one medication every 3 hours. For instance, at noon take ibuprofen, then at 3pm take tylenol, then at 6pm take ibuprofen.  CT head without contrast: 6 mm x 5 mm hyperdense lesion involving the sella, at midline. Leading diagnostic considerations includes a proteinaceous Rathke's cleft cyst, or a small hemorrhagic/hyperdense microadenoma, with an aneurysm felt to be much less likely. Recommend further,  evaluation with CT angiogram of the head with contrast, as well as pituitary protocol MRI without and with contrast.   CTA head and neck with and without contrast: No large vessel occlusion, high-grade stenosis, or intracranial aneurysm identified on CT angiogram of the head. 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 Recommend further evaluation with pituitary protocol MRI without and with contrast., No hemodynamically significant stenosis or dissection identified on CT angiogram of the neck.  Please follow up with your primary care physician within two days.  Return to the Emergency Department if you experience worsening or uncontrolled pain, vision changes, recurrent vomiting, difficulty with normal activities, abnormal behavior, difficulty walking, numbness, weakness, or any other concerning symptoms.     Your carboxyhemoglobin was elevated today. Please make sure your carbon dioxide detectors are working and have new batteries. You may need to ensure that your oil heating system is well  ventilated.     You were evaluated in the Emergency Department today for your knee pain. Your evaluation, including X-rays of your knee, did not show signs of fractures or other acute abnormalities which require further intervention at this time.  Your knee has been ace wrapped and you were given crutches in the ER to help your knee heal. Please rest, ice and elevate your knee, and resume normal activities as tolerated.  We recommend you take 600mg ibuprofen every 6 hours or tylenol 650mg every 6 hours as needed for pain. If needed, you can alternate these medications so that you take one medication every 3 hours. For instance, at noon take ibuprofen, then at 3pm take tylenol, then at 6pm take ibuprofen.   Please follow up with your primary care physician within three days.  Return to the Emergency Department if you experience worsening or uncontrolled pain, numbness, tingling, or weakness to your legs, difficulty walking, worsening knee swelling or redness, or any other concerning symptoms.

## 2025-02-10 LAB
ATRIAL RATE: 80 BPM
P AXIS: 58 DEGREES
PR INTERVAL: 146 MS
QRS AXIS: 54 DEGREES
QRSD INTERVAL: 66 MS
QT INTERVAL: 420 MS
QTC INTERVAL: 484 MS
T WAVE AXIS: 69 DEGREES
VENTRICULAR RATE: 80 BPM

## 2025-02-10 PROCEDURE — 93010 ELECTROCARDIOGRAM REPORT: CPT | Performed by: INTERNAL MEDICINE

## 2025-02-18 ENCOUNTER — OFFICE VISIT (OUTPATIENT)
Dept: FAMILY MEDICINE CLINIC | Facility: CLINIC | Age: 40
End: 2025-02-18

## 2025-02-18 VITALS
RESPIRATION RATE: 18 BRPM | SYSTOLIC BLOOD PRESSURE: 112 MMHG | BODY MASS INDEX: 40.18 KG/M2 | OXYGEN SATURATION: 99 % | WEIGHT: 250 LBS | HEART RATE: 92 BPM | HEIGHT: 66 IN | DIASTOLIC BLOOD PRESSURE: 70 MMHG

## 2025-02-18 DIAGNOSIS — R51.9 INTRACTABLE HEADACHE, UNSPECIFIED CHRONICITY PATTERN, UNSPECIFIED HEADACHE TYPE: ICD-10-CM

## 2025-02-18 DIAGNOSIS — R51.9 NONINTRACTABLE HEADACHE, UNSPECIFIED CHRONICITY PATTERN, UNSPECIFIED HEADACHE TYPE: Primary | ICD-10-CM

## 2025-02-18 DIAGNOSIS — R90.89 ABNORMAL BRAIN CT: ICD-10-CM

## 2025-02-18 LAB — SL AMB POCT HEMOGLOBIN AIC: 5.8 (ref ?–6.5)

## 2025-02-18 PROCEDURE — 83036 HEMOGLOBIN GLYCOSYLATED A1C: CPT | Performed by: STUDENT IN AN ORGANIZED HEALTH CARE EDUCATION/TRAINING PROGRAM

## 2025-02-18 PROCEDURE — 99214 OFFICE O/P EST MOD 30 MIN: CPT | Performed by: STUDENT IN AN ORGANIZED HEALTH CARE EDUCATION/TRAINING PROGRAM

## 2025-02-18 RX ORDER — CALCIUM CARBONATE/VITAMIN D3 500-10/5ML
400 LIQUID (ML) ORAL 2 TIMES DAILY
Qty: 60 TABLET | Refills: 0 | Status: SHIPPED | OUTPATIENT
Start: 2025-02-18

## 2025-02-18 NOTE — PROGRESS NOTES
Name: Ju Griffith      : 1985      MRN: [id number]  Encounter Provider: Reji Lee MD  Encounter Date: 2025   Encounter department: [facility] [address]  :  Assessment & Plan  Nonintractable headache, unspecified chronicity pattern, unspecified headache type    Orders:  •  MRI brain pituitary wo and w contrast; Future  •  Magnesium Oxide 400 MG CAPS; Take 1 tablet (400 mg total) by mouth 2 (two) times a day  •  POCT hemoglobin A1c    Intractable headache, unspecified chronicity pattern, unspecified headache type    Orders:  •  Magnesium Oxide 400 MG CAPS; Take 1 tablet (400 mg total) by mouth 2 (two) times a day  •  POCT hemoglobin A1c    Abnormal brain CT    Orders:  •  Ambulatory Referral to Neurosurgery; Future    Elevated carboxyhemoglobin number. Does use tobacco, ask that she check her carbon monoxide detectors. Reports smoke alarm works  A1c: 5.8  Vision test: normal        History of Present Illness   HPI    Periods of headaches, dizziness and changes in vision. Seen in ER and had a cat scan concerning. States its whole field vision gets blurry and not tunnel vision. Headache today is on the left side. Location can move around, at the front of her head and sometimes on the  left. Its never a pounding debilitating headache. Feels it all day but doesn't really get worse, when she ate the headache improved. Believes she had sensitivity to light but unable to better explain this (has to squint). Has had periods where she feels like she is going to pass out. Can get this feeling walking around as well. Vision changes arfe in both eyes. Had relief in the ER from the IV cocktail. Sister gets headaches as well.    She missed 4 days of work due to these symptoms and was seen. States she used up her PTO    R knee has been feeling weak, was swollen. Bengay is helping  Review of Systems    Objective   /70 (BP Location: Left arm, Patient Position:  "Sitting, Cuff Size: Large)   Pulse 92   Resp 18   Ht 5' 6\" (1.676 m)   Wt 113 kg (250 lb)   SpO2 99%   BMI 40.35 kg/m²      Physical Exam    "

## 2025-02-19 ENCOUNTER — TELEPHONE (OUTPATIENT)
Age: 40
End: 2025-02-19

## 2025-02-19 NOTE — TELEPHONE ENCOUNTER
Pt called requesting the phone numbers to central scheduling and neurosurgery to schedule her appointments.  Phone numbers were provided and a soft transfer was done to connect patient with central scheduling.

## 2025-02-21 NOTE — ED ATTENDING ATTESTATION
2/9/2025  IBirdie DO, saw and evaluated the patient. I have discussed the patient with the resident/non-physician practitioner and agree with the resident's/non-physician practitioner's findings, Plan of Care, and MDM as documented in the resident's/non-physician practitioner's note, except where noted. All available labs and Radiology studies were reviewed.  I was present for key portions of any procedure(s) performed by the resident/non-physician practitioner and I was immediately available to provide assistance.       At this point I agree with the current assessment done in the Emergency Department.  I have conducted an independent evaluation of this patient a history and physical is as follows:    39y F w/ established pmhx HA presenting for HA, knee pain x 1 month. Current smoker. Does have smoke alarms. Reports daily HA. Denies F/C, N/V, CP/SOB. Additionally notes atraumatic knee pain. Denies rashes, N/W of LE.    General-comfortable  Neuro-no acute neurological deficits; alert and oriented x3. CN II-XII grossly intact. 5/5 BL UE/LE strength, nl sensation. Normal finger-to-nose.  HEENT-EOM intact no pain on EOM, oropharynx clear and patent  Cardiac-regular rate rhythm, no murmurs rubs or gallops  Pulmonary-clear to auscultation bilaterally, no adventitious breath sounds  GI-soft, nontender, no distension, normal bowel sounds  -voiding  Musculoskeletal-moves all extremities; neurovascularly intact. R knee without significant swelling/warmth. Able to passively range without difficulty.  Skin-warm and well perfused    W/u demonstrates elevated CoHgb, known smoker. HA resolved in ED w/ HA cocktail. No indication for hypebarics at this time. Knee XR WNL, low clinical suspicion for fx, septic arthritis, good active/passive ROM without systemic symptoms. 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 PCP f/u recommended. Ortho f/u recommended. RTER precautions given, pt d/c home in  good condition by PA.    ED Course         Critical Care Time  Procedures

## 2025-03-04 ENCOUNTER — TELEPHONE (OUTPATIENT)
Dept: FAMILY MEDICINE CLINIC | Facility: CLINIC | Age: 40
End: 2025-03-04

## 2025-03-04 NOTE — TELEPHONE ENCOUNTER
Notifying pt through "[employer]" Portal.     Pt advised to f/u w/Margot [email or web address] or to contact them directly @[phone],     Know your price for health care.  Evaluate your options. Choose the lowest price.  [employer] users, get an estimate         PriceChecker  Know Your Costs Before Receiving Medical Care     Estimate : $ 850.00 for CPT 66307/ MRI brain pituitary wo and w contrast

## 2025-03-04 NOTE — TELEPHONE ENCOUNTER
Patient called the RX Refill Line. Message is being forwarded to the office.     Patient called stating she does not have insurance and would like to know how much the MRI is going to cost because she knows she will have to make a payment.     Please contact patient at [phone]

## 2025-03-10 ENCOUNTER — TELEPHONE (OUTPATIENT)
Dept: NEUROLOGY | Facility: CLINIC | Age: 40
End: 2025-03-10

## 2025-03-10 NOTE — TELEPHONE ENCOUNTER
Spoke with patient and she informed me that she wanted to do MRI first. Therefore her appt was rescheduled for 03/17 at 1 pm with Liliya Bowden

## 2025-03-12 ENCOUNTER — HOSPITAL ENCOUNTER (OUTPATIENT)
Dept: MRI IMAGING | Facility: CLINIC | Age: 40
Discharge: HOME | End: 2025-03-12

## 2025-03-12 DIAGNOSIS — R51.9 NONINTRACTABLE HEADACHE, UNSPECIFIED CHRONICITY PATTERN, UNSPECIFIED HEADACHE TYPE: ICD-10-CM

## 2025-03-12 PROCEDURE — 70553 MRI BRAIN STEM W/O & W/DYE: CPT

## 2025-03-12 PROCEDURE — A9585 GADOBUTROL INJECTION: HCPCS | Performed by: STUDENT IN AN ORGANIZED HEALTH CARE EDUCATION/TRAINING PROGRAM

## 2025-03-12 RX ORDER — GADOBUTROL 604.72 MG/ML
11 INJECTION INTRAVENOUS
Status: COMPLETED | OUTPATIENT
Start: 2025-03-12 | End: 2025-03-12

## 2025-03-12 RX ADMIN — GADOBUTROL 11 ML: 604.72 INJECTION INTRAVENOUS at 13:59

## 2025-03-14 ENCOUNTER — TELEPHONE (OUTPATIENT)
Dept: NEUROLOGY | Facility: CLINIC | Age: 40
End: 2025-03-14

## 2025-03-17 ENCOUNTER — RESULTS FOLLOW-UP (OUTPATIENT)
Dept: FAMILY MEDICINE CLINIC | Facility: CLINIC | Age: 40
End: 2025-03-17

## 2025-03-28 ENCOUNTER — CONSULT (OUTPATIENT)
Dept: NEUROSURGERY | Facility: CLINIC | Age: 40
End: 2025-03-28

## 2025-03-28 VITALS
WEIGHT: 249 LBS | RESPIRATION RATE: 16 BRPM | DIASTOLIC BLOOD PRESSURE: 90 MMHG | TEMPERATURE: 97.2 F | BODY MASS INDEX: 40.02 KG/M2 | OXYGEN SATURATION: 99 % | HEART RATE: 81 BPM | HEIGHT: 66 IN | SYSTOLIC BLOOD PRESSURE: 128 MMHG

## 2025-03-28 DIAGNOSIS — D35.2 PITUITARY ADENOMA (HCC): Primary | ICD-10-CM

## 2025-03-28 DIAGNOSIS — R90.89 ABNORMAL BRAIN CT: ICD-10-CM

## 2025-03-28 PROCEDURE — 99244 OFF/OP CNSLTJ NEW/EST MOD 40: CPT | Performed by: PHYSICIAN ASSISTANT

## 2025-03-28 NOTE — ASSESSMENT & PLAN NOTE
"Pt presents to the  nsgy office as a new pt for evaluation of a pituitary microadenoma.   Initially noted on CTH completed in the ER for HA workup in Feb 2025   MRI brain pituitary ordered by PCP for further workup and pt referred to the  nsgy office for evaluation   Today, pt states she is doing very well. She offers no new complaints   Reports HA much better controlled since starting Magnesium   Denies any vision changes     Imaging:   3/12/2025 MRI brain pituitary w/wo: 6 mm T1 hyperintense lesion arising from superior margin of the pituitary gland in the midline immediately anterior to the infundibulum. Differentials include proteinaceous/hemorrhagic Rathke's cleft cyst versus cystic microadenoma. Solitary tiny focus of FLAIR hyperintensity in the right frontal lobe white matter. Finding is nonspecific. Main differentials include sequela of chronic migraines versus precocious microangiopathy.  2/9/2025 CTA head/neck: No large vessel occlusion, high-grade stenosis, or intracranial aneurysm identified on CT angiogram of the head. The hyperdense lesion involving the sella, does not enhance on CTA, and likely represents a proteinaceous Rathke's cleft cyst or hyperdense small pituitary adenoma. No hemodynamically significant stenosis or dissection identified on CT angiogram of the neck.  2/9 CTH: 6 mm x 5 mm hyperdense lesion involving the sella, at midline. Leading diagnostic considerations includes a proteinaceous Rathke's cleft cyst, or a small hemorrhagic/hyperdense microadenoma, with an aneurysm felt to be much less likely.     Plan:   Pt encouraged to continue to closely monitor neurological exam and sx   Pt educated on \"red flag\" s/sx to monitor for including weakness, N/T, vision changes (specifically peripheral vision loss), AMS, speech trouble, worsening HA, seizure like activity, LOC, etc.   Imaging reviewed at length with the pt   MRI brain reveals a small, 5x6 mm pituitary microadenoma vs rathke's " cleft cyst without compression or displacement of the optic chiasm   Explained to the pt that this finding grady snot explain her HA sx   Discussed at length with the pt the diagnosis of and the natural hx of a pituitary adenoma   No acute indication for neurosurgical intervention.   Recommend ongoing surveillance with repeat MRI brain pituitary w/wo in 3 months   Will follow-up with the the pt upon completion of this imaging   Appt to be scheduled with an AP solo   Referral placed to  endocrinology for pt to undergo further workup with completion of pituitary labs   Referral placed to  ophthalmology to obtain baseline VF   Referral placed to  weight management at the request of th ept to work to optimize her overall health   Encouraged ongoing follow-up with her PCP for HA management.   Pt agreeable to the above noted plan   All questions answered at this time   Pt encouraged to call the office with any further questions or concerns or should they experience any worsening sx    Orders:    MRI brain pituitary wo and w contrast; Future    Ambulatory Referral to Weight Management; Future    Ambulatory Referral to Endocrinology; Future    Ambulatory Referral to Ophthalmology; Future

## 2025-03-28 NOTE — PROGRESS NOTES
"Name: Ju Griffith      : 1985      MRN: [id number]  Encounter Provider: Precious Mcgee PA-C  Encounter Date: 3/28/2025   Encounter department: [facility] [place]  :  Assessment & Plan  Pituitary adenoma (HCC)  Pt presents to the  nsgy office as a new pt for evaluation of a pituitary microadenoma.   Initially noted on CTH completed in the ER for HA workup in 2025   MRI brain pituitary ordered by PCP for further workup and pt referred to the  nsgy office for evaluation   Today, pt states she is doing very well. She offers no new complaints   Reports HA much better controlled since starting Magnesium   Denies any vision changes     Imaging:   3/12/2025 MRI brain pituitary w/wo: 6 mm T1 hyperintense lesion arising from superior margin of the pituitary gland in the midline immediately anterior to the infundibulum. Differentials include proteinaceous/hemorrhagic Rathke's cleft cyst versus cystic microadenoma. Solitary tiny focus of FLAIR hyperintensity in the right frontal lobe white matter. Finding is nonspecific. Main differentials include sequela of chronic migraines versus precocious microangiopathy.  2025 CTA head/neck: No large vessel occlusion, high-grade stenosis, or intracranial aneurysm identified on CT angiogram of the head. The hyperdense lesion involving the sella, does not enhance on CTA, and likely represents a proteinaceous Rathke's cleft cyst or hyperdense small pituitary adenoma. No hemodynamically significant stenosis or dissection identified on CT angiogram of the neck.   CTH: 6 mm x 5 mm hyperdense lesion involving the sella, at midline. Leading diagnostic considerations includes a proteinaceous Rathke's cleft cyst, or a small hemorrhagic/hyperdense microadenoma, with an aneurysm felt to be much less likely.     Plan:   Pt encouraged to continue to closely monitor neurological exam and sx   Pt educated on \"red flag\" s/sx to monitor for " including weakness, N/T, vision changes (specifically peripheral vision loss), AMS, speech trouble, worsening HA, seizure like activity, LOC, etc.   Imaging reviewed at length with the pt   MRI brain reveals a small, 5x6 mm pituitary microadenoma vs rathke's cleft cyst without compression or displacement of the optic chiasm   Explained to the pt that this finding grady snot explain her HA sx   Discussed at length with the pt the diagnosis of and the natural hx of a pituitary adenoma   No acute indication for neurosurgical intervention.   Recommend ongoing surveillance with repeat MRI brain pituitary w/wo in 3 months   Will follow-up with the the pt upon completion of this imaging   Appt to be scheduled with an AP solo   Referral placed to  endocrinology for pt to undergo further workup with completion of pituitary labs   Referral placed to  ophthalmology to obtain baseline VF   Referral placed to  weight management at the request of th ept to work to optimize her overall health   Encouraged ongoing follow-up with her PCP for HA management.   Pt agreeable to the above noted plan   All questions answered at this time   Pt encouraged to call the office with any further questions or concerns or should they experience any worsening sx    Orders:    MRI brain pituitary wo and w contrast; Future    Ambulatory Referral to Weight Management; Future    Ambulatory Referral to Endocrinology; Future    Ambulatory Referral to Ophthalmology; Future      History of Present Illness     Pt is a 40yo F who presents to the  nsgy office as a new pt for evaluation of an incidentally noted pituitary microadenoma. This was noted incidentally on workup for migraine headaches in Feb 2025. Pt was seen itn he ER for persistent and progressively worsening HA. She was ultimately discharged home from the ER and has been following up with her PCP in regard to the HA and this incidentally noted pituitary microadenoma. Pt has since been  "started on magnesium for her HA and reports significant improvement, which she is very happy about. Pt was ordered an outpt MRI brain pituitary for further evaluation of this incidental microadenoma and was referred to the  nsgy office for recommendations.     Pt arrives today by herself. She offers no complaints at this time. Pt states her HA remains well controlled on her current regimen. She denies any vision changes, loss of peripheral vision, dizziness, AMS, speech trouble, weakness, N/T, seizures, LOC, etc.          Review of Systems   HENT:  Negative for tinnitus.    Eyes:  Negative for visual disturbance.   Respiratory:  Negative for chest tightness and shortness of breath.    Gastrointestinal: Negative.    Genitourinary: Negative.    Neurological:  Positive for headaches (occasional, tolerable, better since starting magnesium). Negative for dizziness, seizures, speech difficulty, weakness, light-headedness and numbness.   Psychiatric/Behavioral:  Negative for confusion and decreased concentration.      I have personally reviewed the MA's review of systems and made changes as necessary.    Past Medical History   Past Medical History:   Diagnosis Date    Pituitary lesion (HCC)      Past Surgical History:   Procedure Laterality Date     SECTION       History reviewed. No pertinent family history.  she reports that she has been smoking cigarettes. She started smoking about 25 years ago. She has a 12.6 pack-year smoking history. She has never used smokeless tobacco. She reports current alcohol use. She reports that she does not use drugs.  Current Outpatient Medications   Medication Instructions    Magnesium Oxide 400 mg, Oral, 2 times daily   No Known Allergies   Objective   /90 (BP Location: Left arm, Patient Position: Sitting, Cuff Size: Large)   Pulse 81   Temp (!) 97.2 °F (36.2 °C) (Temporal)   Resp 16   Ht 5' 6\" (1.676 m)   Wt 113 kg (249 lb)   SpO2 99%   BMI 40.19 kg/m²     Physical " Exam  Neurological Exam  General appearance: alert, appears stated age, cooperative and no distress  Head: Normocephalic, without obvious abnormality, atraumatic  Eyes: EOMI, PERRL  - VF intact throughout   Neck: supple, symmetrical, trachea midline and NT  Back: no kyphosis present, no tenderness to percussion or palpation  Lungs: non labored breathing  Heart: regular heart rate  Neurologic:   Mental status: Alert, oriented x3 , thought content appropriate  Cranial nerves: grossly intact (Cranial nerves II-XII)  Sensory: normal to LT mandi throughout   Motor: moving all extremities without focal weakness   Coordination: finger to nose normal bilaterally, no drift bilaterally    Radiology Results Review: I personally reviewed the following image studies in PACS and associated radiology reports: MRI brain. My interpretation of the radiology images/reports is: see above.    Administrative Statements   I have spent a total time of 40 minutes in caring for this patient on the day of the visit/encounter including Diagnostic results, Prognosis, Risks and benefits of tx options, Instructions for management, Patient and family education, Importance of tx compliance, Risk factor reductions, Impressions, Counseling / Coordination of care, Documenting in the medical record, Reviewing/placing orders in the medical record (including tests, medications, and/or procedures), and Obtaining or reviewing history  .

## 2025-05-25 ENCOUNTER — APPOINTMENT (EMERGENCY)
Dept: CT IMAGING | Facility: HOSPITAL | Age: 40
End: 2025-05-25

## 2025-05-25 ENCOUNTER — HOSPITAL ENCOUNTER (EMERGENCY)
Facility: HOSPITAL | Age: 40
Discharge: HOME | End: 2025-05-25
Attending: EMERGENCY MEDICINE

## 2025-05-25 VITALS
WEIGHT: 246.91 LBS | RESPIRATION RATE: 18 BRPM | HEIGHT: 66 IN | HEART RATE: 91 BPM | OXYGEN SATURATION: 95 % | SYSTOLIC BLOOD PRESSURE: 155 MMHG | TEMPERATURE: 98.5 F | BODY MASS INDEX: 39.68 KG/M2 | DIASTOLIC BLOOD PRESSURE: 89 MMHG

## 2025-05-25 DIAGNOSIS — H57.12 EYE PAIN, LEFT: Primary | ICD-10-CM

## 2025-05-25 DIAGNOSIS — R22.0 LEFT FACIAL SWELLING: ICD-10-CM

## 2025-05-25 LAB
ANION GAP SERPL CALCULATED.3IONS-SCNC: 5 MMOL/L (ref 4–13)
BASOPHILS # BLD AUTO: 0.04 THOUSANDS/ÂΜL (ref 0–0.1)
BASOPHILS NFR BLD AUTO: 1 % (ref 0–1)
BUN SERPL-MCNC: 9 MG/DL (ref 5–25)
CALCIUM SERPL-MCNC: 9.1 MG/DL (ref 8.4–10.2)
CHLORIDE SERPL-SCNC: 107 MMOL/L (ref 96–108)
CO2 SERPL-SCNC: 27 MMOL/L (ref 21–32)
CREAT SERPL-MCNC: 0.72 MG/DL (ref 0.6–1.3)
EOSINOPHIL # BLD AUTO: 0.11 THOUSAND/ÂΜL (ref 0–0.61)
EOSINOPHIL NFR BLD AUTO: 2 % (ref 0–6)
ERYTHROCYTE [DISTWIDTH] IN BLOOD BY AUTOMATED COUNT: 16.7 % (ref 11.6–15.1)
EXT PREGNANCY TEST URINE: NEGATIVE
EXT. CONTROL: NORMAL
GFR SERPL CREATININE-BSD FRML MDRD: 105 ML/MIN/1.73SQ M
GLUCOSE SERPL-MCNC: 83 MG/DL (ref 65–140)
HCT VFR BLD AUTO: 38.5 % (ref 34.8–46.1)
HGB BLD-MCNC: 12.1 G/DL (ref 11.5–15.4)
IMM GRANULOCYTES # BLD AUTO: 0.01 THOUSAND/UL (ref 0–0.2)
IMM GRANULOCYTES NFR BLD AUTO: 0 % (ref 0–2)
LYMPHOCYTES # BLD AUTO: 2.57 THOUSANDS/ÂΜL (ref 0.6–4.47)
LYMPHOCYTES NFR BLD AUTO: 34 % (ref 14–44)
MCH RBC QN AUTO: 26.1 PG (ref 26.8–34.3)
MCHC RBC AUTO-ENTMCNC: 31.4 G/DL (ref 31.4–37.4)
MCV RBC AUTO: 83 FL (ref 82–98)
MONOCYTES # BLD AUTO: 0.51 THOUSAND/ÂΜL (ref 0.17–1.22)
MONOCYTES NFR BLD AUTO: 7 % (ref 4–12)
NEUTROPHILS # BLD AUTO: 4.24 THOUSANDS/ÂΜL (ref 1.85–7.62)
NEUTS SEG NFR BLD AUTO: 56 % (ref 43–75)
NRBC BLD AUTO-RTO: 0 /100 WBCS
PLATELET # BLD AUTO: 284 THOUSANDS/UL (ref 149–390)
PMV BLD AUTO: 11.8 FL (ref 8.9–12.7)
POTASSIUM SERPL-SCNC: 4.3 MMOL/L (ref 3.5–5.3)
RBC # BLD AUTO: 4.63 MILLION/UL (ref 3.81–5.12)
SODIUM SERPL-SCNC: 139 MMOL/L (ref 135–147)
WBC # BLD AUTO: 7.48 THOUSAND/UL (ref 4.31–10.16)

## 2025-05-25 PROCEDURE — 81025 URINE PREGNANCY TEST: CPT

## 2025-05-25 PROCEDURE — 99283 EMERGENCY DEPT VISIT LOW MDM: CPT

## 2025-05-25 PROCEDURE — 99284 EMERGENCY DEPT VISIT MOD MDM: CPT

## 2025-05-25 PROCEDURE — 85025 COMPLETE CBC W/AUTO DIFF WBC: CPT

## 2025-05-25 PROCEDURE — 80048 BASIC METABOLIC PNL TOTAL CA: CPT

## 2025-05-25 PROCEDURE — 70487 CT MAXILLOFACIAL W/DYE: CPT

## 2025-05-25 PROCEDURE — 36415 COLL VENOUS BLD VENIPUNCTURE: CPT

## 2025-05-25 RX ORDER — ERYTHROMYCIN 5 MG/G
OINTMENT OPHTHALMIC
Qty: 1 G | Refills: 0 | Status: SHIPPED | OUTPATIENT
Start: 2025-05-25

## 2025-05-25 RX ADMIN — IOHEXOL 85 ML: 350 INJECTION, SOLUTION INTRAVENOUS at 14:04

## 2025-05-25 NOTE — Clinical Note
Ju Griffith was seen and treated in our emergency department on 5/25/2025.                Diagnosis:     Ju  .    She may return on this date: 05/27/2025         If you have any questions or concerns, please don't hesitate to call.      Ting Gomez PA-C    ______________________________           _______________          _______________  Hospital Representative                              Date                                Time

## 2025-05-25 NOTE — ED PROVIDER NOTES
Time reflects when diagnosis was documented in both MDM as applicable and the Disposition within this note       Time User Action Codes Description Comment    5/25/2025  2:49 PM Ting Gomez Add [H57.11] Eye pain, right     5/25/2025  2:49 PM Ting Gomez Remove [H57.11] Eye pain, right     5/25/2025  2:49 PM Evan Gomeze Add [H57.12] Eye pain, left     5/25/2025  2:49 PM Ting Gomez Add [R22.0] Left facial swelling           ED Disposition       ED Disposition   Discharge    Condition   Stable    Date/Time   Sun May 25, 2025  2:49 PM    Comment   Ju Gladis discharge to home/self care.                   Assessment & Plan       Medical Decision Making  39 year-old female presenting with swollen eye and facial swelling. Otherwise well-appearing.No history of trauma. No urticarial rash to suggest allergic reaction. No airway swelling, wheezing, vomiting/diarrhea, or tachycardia/hypotension to suggest anaphylaxis. No proptosis, vision change, or pain with EOM to suggest orbital cellulitis. Ddx includes allergic reaction vs. preseptal cellulitis. Will get labs and CT to r/o preseptal or orbital cellulitis.     During ED course, patient eye and facial swelling significantly decreased and has returned to baseline. Labs with no leukocytosis. CT IMPRESSION: Unremarkable CT of the facial soft tissues.    Discussed with patient reassuring work up. Discussed treatment of eye swelling / discomfort with topical erythromycin. Patient reports feeling better from when she first presented to ED and is no longer having discomfort. Patient is stable for discharge. Prior to discharge, discharge instructions were discussed with patient at bedside. Patient was provided both verbal and written instructions. Patient is understanding of the discharge instructions and is agreeable to plan of care. Return precautions were discussed with patient bedside, patient verbalized understanding of signs and symptoms  that would necessitate return to the ED. All questions were answered. Patient was comfortable with the plan of care and discharged to home.      Amount and/or Complexity of Data Reviewed  Labs: ordered.  Radiology: ordered. Decision-making details documented in ED Course.    Risk  Prescription drug management.        ED Course as of 05/25/25 1621   Sun May 25, 2025   1447 CT facial bones with contrast    IMPRESSION:     Unremarkable CT of the facial soft tissues.         Medications   iohexol (OMNIPAQUE) 350 MG/ML injection (MULTI-DOSE) 100 mL (85 mL Intravenous Given 5/25/25 1404)       ED Risk Strat Scores                    No data recorded        SBIRT 22yo+      Flowsheet Row Most Recent Value   Initial Alcohol Screen: US AUDIT-C     1. How often do you have a drink containing alcohol? 0 Filed at: 05/25/2025 1509   2. How many drinks containing alcohol do you have on a typical day you are drinking?  0 Filed at: 05/25/2025 1509   3b. FEMALE Any Age, or MALE 65+: How often do you have 4 or more drinks on one occassion? 0 Filed at: 05/25/2025 1509   Audit-C Score 0 Filed at: 05/25/2025 1509   HERMELINDA: How many times in the past year have you...    Used an illegal drug or used a prescription medication for non-medical reasons? Never Filed at: 05/25/2025 1509                            History of Present Illness       Chief Complaint   Patient presents with    Eye Pain     C/o left eye pain and burning since she woke up this morning.        Past Medical History[1]   Past Surgical History[2]   Family History[3]   Social History[4]   E-Cigarette/Vaping    E-Cigarette Use Never User       E-Cigarette/Vaping Substances      I have reviewed and agree with the history as documented.     39 year-old female presenting with swollen eye and facial swelling. Report she woke up this morning with swollen eye and face. Reports discomfort to eye, but no pain or foreign body sensation feeling. Denies vision changes. Denies any recent  illnesses, fevers, chills, drainage from eye, congestion, or any other symptoms.       Eye Pain  Associated symptoms: no abdominal pain, no chest pain, no congestion, no cough, no ear pain, no fever, no rash, no shortness of breath, no sore throat and no vomiting        Review of Systems   Constitutional:  Negative for chills and fever.   HENT:  Positive for facial swelling. Negative for congestion, ear pain and sore throat.    Eyes:  Positive for pain. Negative for visual disturbance.   Respiratory:  Negative for cough and shortness of breath.    Cardiovascular:  Negative for chest pain and palpitations.   Gastrointestinal:  Negative for abdominal pain and vomiting.   Genitourinary:  Negative for dysuria and hematuria.   Musculoskeletal:  Negative for arthralgias and back pain.   Skin:  Negative for color change and rash.   Neurological:  Negative for seizures and syncope.   All other systems reviewed and are negative.          Objective       ED Triage Vitals [05/25/25 1152]   Temperature Pulse Blood Pressure Respirations SpO2 Patient Position - Orthostatic VS   98.5 °F (36.9 °C) 91 155/89 18 95 % --      Temp Source Heart Rate Source BP Location FiO2 (%) Pain Score    Tympanic Monitor -- -- --      Vitals      Date and Time Temp Pulse SpO2 Resp BP Pain Score FACES Pain Rating User   05/25/25 1152 98.5 °F (36.9 °C) 91 95 % 18 155/89 -- -- CC            Physical Exam  Vitals and nursing note reviewed.   Constitutional:       General: She is not in acute distress.     Appearance: She is well-developed.   HENT:      Head: Normocephalic and atraumatic. No raccoon eyes, abrasion or contusion.      Comments: There is swelling to left side of face.      Nose:      Right Sinus: No maxillary sinus tenderness or frontal sinus tenderness.      Left Sinus: No maxillary sinus tenderness or frontal sinus tenderness.      Mouth/Throat:      Dentition: Abnormal dentition.      Pharynx: Oropharynx is clear. No pharyngeal swelling,  posterior oropharyngeal erythema or postnasal drip.      Tonsils: No tonsillar exudate or tonsillar abscesses.     Eyes:      General: Lids are normal.         Right eye: No foreign body or discharge.         Left eye: No foreign body or discharge.      Extraocular Movements: Extraocular movements intact.      Right eye: Normal extraocular motion and no nystagmus.      Left eye: Normal extraocular motion and no nystagmus.      Conjunctiva/sclera: Conjunctivae normal.      Right eye: Right conjunctiva is not injected. No chemosis.     Left eye: Left conjunctiva is not injected. Chemosis present. No exudate.     Pupils: Pupils are equal, round, and reactive to light.      Right eye: Pupil is round and reactive.      Left eye: Pupil is round and reactive.      Comments: Surrounding swelling of left eye to cheek.      Cardiovascular:      Rate and Rhythm: Normal rate and regular rhythm.      Heart sounds: No murmur heard.  Pulmonary:      Effort: Pulmonary effort is normal. No respiratory distress.      Breath sounds: Normal breath sounds.   Abdominal:      Palpations: Abdomen is soft.      Tenderness: There is no abdominal tenderness.     Musculoskeletal:         General: No swelling.      Cervical back: Full passive range of motion without pain and neck supple.   Lymphadenopathy:      Cervical: No cervical adenopathy.     Skin:     General: Skin is warm and dry.      Capillary Refill: Capillary refill takes less than 2 seconds.     Neurological:      Mental Status: She is alert.     Psychiatric:         Mood and Affect: Mood normal.         Results Reviewed       Procedure Component Value Units Date/Time    POCT pregnancy, urine [[id number]]  (Normal) Collected: 05/25/25 1427    Lab Status: Final result Updated: 05/25/25 1427     EXT Preg Test, Ur Negative     Control Valid    Basic metabolic panel [[id number]] Collected: 05/25/25 1321    Lab Status: Final result Specimen: Blood from Arm, Right Updated: 05/25/25 1353      Sodium 139 mmol/L      Potassium 4.3 mmol/L      Chloride 107 mmol/L      CO2 27 mmol/L      ANION GAP 5 mmol/L      BUN 9 mg/dL      Creatinine 0.72 mg/dL      Glucose 83 mg/dL      Calcium 9.1 mg/dL      eGFR 105 ml/min/1.73sq m     Narrative:      [employer] guidelines for Chronic Kidney Disease (CKD):     Stage 1 with normal or high GFR (GFR > 90 mL/min/1.73 square meters)    Stage 2 Mild CKD (GFR = 60-89 mL/min/1.73 square meters)    Stage 3A Moderate CKD (GFR = 45-59 mL/min/1.73 square meters)    Stage 3B Moderate CKD (GFR = 30-44 mL/min/1.73 square meters)    Stage 4 Severe CKD (GFR = 15-29 mL/min/1.73 square meters)    Stage 5 End Stage CKD (GFR <15 mL/min/1.73 square meters)  Note: GFR calculation is accurate only with a steady state creatinine    CBC and differential [[id number]]  (Abnormal) Collected: 05/25/25 1237    Lab Status: Final result Specimen: Blood from Arm, Right Updated: 05/25/25 1245     WBC 7.48 Thousand/uL      RBC 4.63 Million/uL      Hemoglobin 12.1 g/dL      Hematocrit 38.5 %      MCV 83 fL      MCH 26.1 pg      MCHC 31.4 g/dL      RDW 16.7 %      MPV 11.8 fL      Platelets 284 Thousands/uL      nRBC 0 /100 WBCs      Segmented % 56 %      Immature Grans % 0 %      Lymphocytes % 34 %      Monocytes % 7 %      Eosinophils Relative 2 %      Basophils Relative 1 %      Absolute Neutrophils 4.24 Thousands/µL      Absolute Immature Grans 0.01 Thousand/uL      Absolute Lymphocytes 2.57 Thousands/µL      Absolute Monocytes 0.51 Thousand/µL      Eosinophils Absolute 0.11 Thousand/µL      Basophils Absolute 0.04 Thousands/µL             CT facial bones with contrast   Final Interpretation by Juliano Welch MD (05/25 3702)      Unremarkable CT of the facial soft tissues.         Resident: Wilbert Campos I, the attending radiologist, have reviewed the images and agree with the final report above.      Workstation performed: [id number]             Procedures    ED  Medication and Procedure Management   Prior to Admission Medications   Prescriptions Last Dose Informant Patient Reported? Taking?   Magnesium Oxide 400 MG CAPS   No No   Sig: Take 1 tablet (400 mg total) by mouth 2 (two) times a day      Facility-Administered Medications: None     Discharge Medication List as of 2025  2:54 PM        START taking these medications    Details   erythromycin (ILOTYCIN) ophthalmic ointment Place a 1/2 inch ribbon of ointment into the lower eyelid as needed., Normal           CONTINUE these medications which have NOT CHANGED    Details   Magnesium Oxide 400 MG CAPS Take 1 tablet (400 mg total) by mouth 2 (two) times a day, Starting 2025, Normal           No discharge procedures on file.  ED SEPSIS DOCUMENTATION   Time reflects when diagnosis was documented in both MDM as applicable and the Disposition within this note       Time User Action Codes Description Comment    2025  2:49 PM Ting Gomez Add [H57.11] Eye pain, right     2025  2:49 PM Jason, Ting Remove [H57.11] Eye pain, right     2025  2:49 PM Jason, Ting Add [H57.12] Eye pain, left     2025  2:49 PM Jason, Ting Add [R22.0] Left facial swelling                    [1]   Past Medical History:  Diagnosis Date    Pituitary lesion (HCC)    [2]   Past Surgical History:  Procedure Laterality Date     SECTION     [3] No family history on file.  [4]   Social History  Tobacco Use    Smoking status: Every Day     Current packs/day: 0.50     Average packs/day: 0.5 packs/day for 25.4 years (12.7 ttl pk-yrs)     Types: Cigarettes     Start date:     Smokeless tobacco: Never   Vaping Use    Vaping status: Never Used   Substance Use Topics    Alcohol use: Yes     Comment: socially    Drug use: Never        Ting Gomez PA-C  25 1680

## 2025-05-25 NOTE — DISCHARGE INSTRUCTIONS
You have been evaluated in the Emergency Department today for eye pain and facial swelling. Your evaluation, including CT, does not suggests anything requiring emergent intervention.     Please follow up with your primary care physician within two days.    Return to the Emergency Department if you experience worsening pain, unable to open eye, increased swelling, or any other concerning symptoms.

## 2025-07-07 ENCOUNTER — TELEPHONE (OUTPATIENT)
Dept: NEUROSURGERY | Facility: CLINIC | Age: 40
End: 2025-07-07

## 2025-07-07 NOTE — TELEPHONE ENCOUNTER
Contacted patient regarding appt w/ WO at [facility] - Pt rqstd to cancel did not want to RS at : this time.